# Patient Record
Sex: MALE | Race: WHITE | HISPANIC OR LATINO | Employment: FULL TIME | URBAN - METROPOLITAN AREA
[De-identification: names, ages, dates, MRNs, and addresses within clinical notes are randomized per-mention and may not be internally consistent; named-entity substitution may affect disease eponyms.]

---

## 2022-09-02 ENCOUNTER — OFFICE VISIT (OUTPATIENT)
Dept: URGENT CARE | Facility: PHYSICIAN GROUP | Age: 37
End: 2022-09-02
Payer: COMMERCIAL

## 2022-09-02 VITALS
SYSTOLIC BLOOD PRESSURE: 126 MMHG | RESPIRATION RATE: 22 BRPM | OXYGEN SATURATION: 97 % | HEIGHT: 65 IN | WEIGHT: 182 LBS | TEMPERATURE: 99.2 F | DIASTOLIC BLOOD PRESSURE: 80 MMHG | BODY MASS INDEX: 30.32 KG/M2 | HEART RATE: 109 BPM

## 2022-09-02 DIAGNOSIS — S96.912A MUSCLE STRAIN OF LEFT FOOT, INITIAL ENCOUNTER: ICD-10-CM

## 2022-09-02 PROCEDURE — 99203 OFFICE O/P NEW LOW 30 MIN: CPT | Performed by: PHYSICIAN ASSISTANT

## 2022-09-02 RX ORDER — NAPROXEN 500 MG/1
500 TABLET ORAL 2 TIMES DAILY WITH MEALS
Qty: 30 TABLET | Refills: 0 | Status: SHIPPED | OUTPATIENT
Start: 2022-09-02 | End: 2023-02-23

## 2022-09-02 ASSESSMENT — ENCOUNTER SYMPTOMS
ABDOMINAL PAIN: 0
MYALGIAS: 0
CHILLS: 0
COUGH: 0
NAUSEA: 0
DIARRHEA: 0
SHORTNESS OF BREATH: 0
VOMITING: 0
EYE PAIN: 0
CONSTIPATION: 0
HEADACHES: 0
FEVER: 0
SORE THROAT: 0

## 2022-09-02 NOTE — LETTER
September 2, 2022    To Whom It May Concern:         This is confirmation that Gallo Justice attended his scheduled appointment with Guanaco Beaulieu P.A.-C. on 9/02/22.  I recommend this patient take 2 to 4 days of work to recover from injury.  He is cleared to return to work after he is feeling improved.         If you have any questions please do not hesitate to call me at the phone number listed below.    Sincerely,          Guanaco Beaulieu P.A.-C.  [phone]

## 2022-09-03 NOTE — PROGRESS NOTES
"Subjective:   Gallo Justice is a 37 y.o. male who presents for Ankle Pain (Ankle pain x2days ago today, hurts to walk, radiating to bottom of sole. 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 )      This a pleasant 37-year-old male accompanied by family member acting as  whose had worsening left-sided foot pain over the last 3 days.  No acute injury or trauma.  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  Pain has been worse in the last 2 days so much so it hurts to walk.  Has not had any recurrent issues or comorbidities contributing to his ankle pain    Review of Systems   Constitutional:  Negative for chills and fever.   HENT:  Negative for congestion, ear pain and sore throat.    Eyes:  Negative for pain.   Respiratory:  Negative for cough and shortness of breath.    Cardiovascular:  Negative for chest pain.   Gastrointestinal:  Negative for abdominal pain, constipation, diarrhea, nausea and vomiting.   Genitourinary:  Negative for dysuria.   Musculoskeletal:  Negative for myalgias.   Skin:  Negative for rash.   Neurological:  Negative for headaches.     Medications, Allergies, and current problem list reviewed today in Epic.     Objective:     /80   Pulse (!) 109   Temp 37.3 °C (99.2 °F)   Resp (!) 22   Ht 1.651 m (5' 5\")   Wt 82.6 kg (182 lb)   SpO2 97%     Physical Exam  Vitals reviewed.   Constitutional:       Appearance: Normal appearance.   HENT:      Head: Normocephalic and atraumatic.      Right Ear: External ear normal.      Left Ear: External ear normal.      Nose: Nose normal.      Mouth/Throat:      Mouth: Mucous membranes are moist.   Eyes:      Conjunctiva/sclera: Conjunctivae normal.   Cardiovascular:      Rate and Rhythm: Normal rate.   Pulmonary:      Effort: Pulmonary effort is normal.   Musculoskeletal:      Comments:  focal pain and swelling over proximal " forefoot just distal to the ATFL.  No pain with range of motion of the ankle however pain with range of motion of the foot.  No deformity.  Nontender over bony prominences.  Neurovascular intact.  Ambulatory with an antalgic gait   Skin:     General: Skin is warm and dry.      Capillary Refill: Capillary refill takes less than 2 seconds.   Neurological:      Mental Status: He is alert and oriented to person, place, and time.       Assessment/Plan:     Diagnosis and associated orders:     1. Muscle strain of left foot, initial encounter  naproxen (NAPROSYN) 500 MG Tab    Referral to Sports Medicine         Comments/MDM:     Given the patient's forced dorsiflexion at work I would suspect some element of foot sprain or strain specifically of the dorsiflexors of the foot.  I provided him with a handout including gentle stretches and exercises, recommend ice, anti-inflammatories and elevation and I instructed him to take 2 to 4 days off work.  If he is not improving recommend he follow-up with sports medicine to one of our excellent Thai-speaking providers.  Low suspicion for fracture.  No evidence of Lisfranc injury or base of the fifth metatarsal injury         Differential diagnosis, natural history, supportive care, and indications for immediate follow-up discussed.    Advised the patient to follow-up with the primary care physician for recheck, reevaluation, and consideration of further management.    Please note that this dictation was created using voice recognition software. I have made a reasonable attempt to correct obvious errors, but I expect that there are errors of grammar and possibly content that I did not discover before finalizing the note.    This note was electronically signed by Guanaco Beaulieu PA-C

## 2022-12-12 ENCOUNTER — APPOINTMENT (OUTPATIENT)
Dept: RADIOLOGY | Facility: MEDICAL CENTER | Age: 37
End: 2022-12-12
Attending: EMERGENCY MEDICINE
Payer: COMMERCIAL

## 2022-12-12 ENCOUNTER — HOSPITAL ENCOUNTER (EMERGENCY)
Facility: MEDICAL CENTER | Age: 37
End: 2022-12-13
Attending: EMERGENCY MEDICINE
Payer: COMMERCIAL

## 2022-12-12 DIAGNOSIS — J10.1 INFLUENZA A: ICD-10-CM

## 2022-12-12 DIAGNOSIS — I10 HYPERTENSION, UNSPECIFIED TYPE: ICD-10-CM

## 2022-12-12 LAB
ALBUMIN SERPL BCP-MCNC: 4.6 G/DL (ref 3.2–4.9)
ALBUMIN/GLOB SERPL: 1.4 G/DL
ALP SERPL-CCNC: 73 U/L (ref 30–99)
ALT SERPL-CCNC: 112 U/L (ref 2–50)
ANION GAP SERPL CALC-SCNC: 13 MMOL/L (ref 7–16)
AST SERPL-CCNC: 64 U/L (ref 12–45)
BASOPHILS # BLD AUTO: 0.8 % (ref 0–1.8)
BASOPHILS # BLD: 0.06 K/UL (ref 0–0.12)
BILIRUB SERPL-MCNC: 0.5 MG/DL (ref 0.1–1.5)
BUN SERPL-MCNC: 10 MG/DL (ref 8–22)
CALCIUM SERPL-MCNC: 9.7 MG/DL (ref 8.5–10.5)
CHLORIDE SERPL-SCNC: 100 MMOL/L (ref 96–112)
CO2 SERPL-SCNC: 24 MMOL/L (ref 20–33)
CREAT SERPL-MCNC: 0.85 MG/DL (ref 0.5–1.4)
D DIMER PPP IA.FEU-MCNC: 0.31 UG/ML (FEU) (ref 0–0.5)
EKG IMPRESSION: NORMAL
EOSINOPHIL # BLD AUTO: 0.08 K/UL (ref 0–0.51)
EOSINOPHIL NFR BLD: 1.1 % (ref 0–6.9)
ERYTHROCYTE [DISTWIDTH] IN BLOOD BY AUTOMATED COUNT: 41.6 FL (ref 35.9–50)
ETHANOL BLD-MCNC: <10.1 MG/DL
GFR SERPLBLD CREATININE-BSD FMLA CKD-EPI: 114 ML/MIN/1.73 M 2
GLOBULIN SER CALC-MCNC: 3.3 G/DL (ref 1.9–3.5)
GLUCOSE SERPL-MCNC: 103 MG/DL (ref 65–99)
HCT VFR BLD AUTO: 48.7 % (ref 42–52)
HGB BLD-MCNC: 17.1 G/DL (ref 14–18)
IMM GRANULOCYTES # BLD AUTO: 0.03 K/UL (ref 0–0.11)
IMM GRANULOCYTES NFR BLD AUTO: 0.4 % (ref 0–0.9)
LYMPHOCYTES # BLD AUTO: 0.99 K/UL (ref 1–4.8)
LYMPHOCYTES NFR BLD: 13.5 % (ref 22–41)
MCH RBC QN AUTO: 32.4 PG (ref 27–33)
MCHC RBC AUTO-ENTMCNC: 35.1 G/DL (ref 33.7–35.3)
MCV RBC AUTO: 92.2 FL (ref 81.4–97.8)
MONOCYTES # BLD AUTO: 1.1 K/UL (ref 0–0.85)
MONOCYTES NFR BLD AUTO: 15 % (ref 0–13.4)
NEUTROPHILS # BLD AUTO: 5.08 K/UL (ref 1.82–7.42)
NEUTROPHILS NFR BLD: 69.2 % (ref 44–72)
NRBC # BLD AUTO: 0 K/UL
NRBC BLD-RTO: 0 /100 WBC
PLATELET # BLD AUTO: 189 K/UL (ref 164–446)
PMV BLD AUTO: 11.6 FL (ref 9–12.9)
POTASSIUM SERPL-SCNC: 3.6 MMOL/L (ref 3.6–5.5)
PROT SERPL-MCNC: 7.9 G/DL (ref 6–8.2)
RBC # BLD AUTO: 5.28 M/UL (ref 4.7–6.1)
SODIUM SERPL-SCNC: 137 MMOL/L (ref 135–145)
WBC # BLD AUTO: 7.3 K/UL (ref 4.8–10.8)

## 2022-12-12 PROCEDURE — 99284 EMERGENCY DEPT VISIT MOD MDM: CPT

## 2022-12-12 PROCEDURE — 700111 HCHG RX REV CODE 636 W/ 250 OVERRIDE (IP): Performed by: EMERGENCY MEDICINE

## 2022-12-12 PROCEDURE — 700102 HCHG RX REV CODE 250 W/ 637 OVERRIDE(OP): Performed by: EMERGENCY MEDICINE

## 2022-12-12 PROCEDURE — 83605 ASSAY OF LACTIC ACID: CPT

## 2022-12-12 PROCEDURE — C9803 HOPD COVID-19 SPEC COLLECT: HCPCS | Performed by: EMERGENCY MEDICINE

## 2022-12-12 PROCEDURE — 85025 COMPLETE CBC W/AUTO DIFF WBC: CPT

## 2022-12-12 PROCEDURE — 80307 DRUG TEST PRSMV CHEM ANLYZR: CPT

## 2022-12-12 PROCEDURE — 700105 HCHG RX REV CODE 258: Performed by: EMERGENCY MEDICINE

## 2022-12-12 PROCEDURE — 93005 ELECTROCARDIOGRAM TRACING: CPT

## 2022-12-12 PROCEDURE — 82077 ASSAY SPEC XCP UR&BREATH IA: CPT

## 2022-12-12 PROCEDURE — 0241U HCHG SARS-COV-2 COVID-19 NFCT DS RESP RNA 4 TRGT MIC: CPT

## 2022-12-12 PROCEDURE — 96374 THER/PROPH/DIAG INJ IV PUSH: CPT

## 2022-12-12 PROCEDURE — A9270 NON-COVERED ITEM OR SERVICE: HCPCS | Performed by: EMERGENCY MEDICINE

## 2022-12-12 PROCEDURE — 93005 ELECTROCARDIOGRAM TRACING: CPT | Performed by: EMERGENCY MEDICINE

## 2022-12-12 PROCEDURE — 80053 COMPREHEN METABOLIC PANEL: CPT

## 2022-12-12 PROCEDURE — 71045 X-RAY EXAM CHEST 1 VIEW: CPT

## 2022-12-12 PROCEDURE — 36415 COLL VENOUS BLD VENIPUNCTURE: CPT

## 2022-12-12 PROCEDURE — 85379 FIBRIN DEGRADATION QUANT: CPT

## 2022-12-12 RX ORDER — AMLODIPINE BESYLATE 5 MG/1
5 TABLET ORAL ONCE
Status: COMPLETED | OUTPATIENT
Start: 2022-12-13 | End: 2022-12-12

## 2022-12-12 RX ORDER — DIAZEPAM 5 MG/1
5 TABLET ORAL ONCE
Status: COMPLETED | OUTPATIENT
Start: 2022-12-13 | End: 2022-12-13

## 2022-12-12 RX ORDER — LORAZEPAM 2 MG/ML
1 INJECTION INTRAMUSCULAR ONCE
Status: COMPLETED | OUTPATIENT
Start: 2022-12-12 | End: 2022-12-12

## 2022-12-12 RX ORDER — SODIUM CHLORIDE 9 MG/ML
1000 INJECTION, SOLUTION INTRAVENOUS ONCE
Status: COMPLETED | OUTPATIENT
Start: 2022-12-12 | End: 2022-12-13

## 2022-12-12 RX ADMIN — AMLODIPINE BESYLATE 5 MG: 5 TABLET ORAL at 23:59

## 2022-12-12 RX ADMIN — LORAZEPAM 1 MG: 2 INJECTION INTRAMUSCULAR; INTRAVENOUS at 23:40

## 2022-12-12 RX ADMIN — SODIUM CHLORIDE 1000 ML: 9 INJECTION, SOLUTION INTRAVENOUS at 23:39

## 2022-12-13 VITALS
OXYGEN SATURATION: 93 % | DIASTOLIC BLOOD PRESSURE: 122 MMHG | BODY MASS INDEX: 29.76 KG/M2 | WEIGHT: 185.19 LBS | HEIGHT: 66 IN | TEMPERATURE: 100 F | SYSTOLIC BLOOD PRESSURE: 189 MMHG | RESPIRATION RATE: 18 BRPM | HEART RATE: 117 BPM

## 2022-12-13 LAB
AMPHET UR QL SCN: NEGATIVE
BARBITURATES UR QL SCN: NEGATIVE
BENZODIAZ UR QL SCN: NEGATIVE
BZE UR QL SCN: NEGATIVE
CANNABINOIDS UR QL SCN: NEGATIVE
FLUAV RNA SPEC QL NAA+PROBE: POSITIVE
FLUBV RNA SPEC QL NAA+PROBE: NEGATIVE
LACTATE SERPL-SCNC: 2 MMOL/L (ref 0.5–2)
METHADONE UR QL SCN: NEGATIVE
OPIATES UR QL SCN: NEGATIVE
OXYCODONE UR QL SCN: NEGATIVE
PCP UR QL SCN: NEGATIVE
PROPOXYPH UR QL SCN: NEGATIVE
RSV RNA SPEC QL NAA+PROBE: NEGATIVE
SARS-COV-2 RNA RESP QL NAA+PROBE: NOTDETECTED
SPECIMEN SOURCE: ABNORMAL

## 2022-12-13 PROCEDURE — 700102 HCHG RX REV CODE 250 W/ 637 OVERRIDE(OP): Performed by: EMERGENCY MEDICINE

## 2022-12-13 PROCEDURE — A9270 NON-COVERED ITEM OR SERVICE: HCPCS | Performed by: EMERGENCY MEDICINE

## 2022-12-13 RX ORDER — AMLODIPINE BESYLATE 10 MG/1
10 TABLET ORAL DAILY
Qty: 30 TABLET | Refills: 0 | Status: SHIPPED | OUTPATIENT
Start: 2022-12-13 | End: 2023-02-23

## 2022-12-13 RX ORDER — AMLODIPINE BESYLATE 5 MG/1
5 TABLET ORAL ONCE
Status: COMPLETED | OUTPATIENT
Start: 2022-12-13 | End: 2022-12-13

## 2022-12-13 RX ADMIN — AMLODIPINE BESYLATE 5 MG: 5 TABLET ORAL at 02:01

## 2022-12-13 RX ADMIN — DIAZEPAM 5 MG: 5 TABLET ORAL at 00:23

## 2022-12-13 NOTE — ED NOTES
Assumed report and patient care from Valentin MONIQUE. Patient is resting comfortably in bed with no signs of labored breathing or restlessness. POC discussed, wife to translate. No questions or concerns at this time. Whiteboard updated. Pending amlodipine administration. Safety measures in place, call light within reach.

## 2022-12-13 NOTE — ED PROVIDER NOTES
ED Provider Note      Means of Arrival: Private Vehicle  History obtained from: Patient,  was offered but declined.    CHIEF COMPLAINT  Chief Complaint   Patient presents with    Hypertension    Shortness of Breath       HPI  Gallo Justice is a 37 y.o. male who presents with shortness of breath and high blood pressure.  The patient woke this morning feeling somewhat unwell.  He went to work and felt weak with shortness of breath and racing heart.  He reports that he felt somewhat feverish in the morning, as well as tired and that his body was heavy.  He took some Tylenol earlier in the day.  He reports that he is feeling better now on my evaluation but previously was feeling more short of breath.  He denies any chest pain, abdominal pain, ongoing fevers, headaches, cough, leg swelling.  He denies any recreational drug use.  He reports he usually drinks 3 drinks a day but not as sore every day.  Last drink was yesterday.    REVIEW OF SYSTEMS  CONSTITUTIONAL: See HPI  CARDIOVASCULAR:  See HPI  RESPIRATORY:  See HPI  GASTROINTESTINAL:  No abdominal pain.  See HPI for further details.   All other systems are negative.     PAST MEDICAL HISTORY  History reviewed. No pertinent past medical history.    FAMILY HISTORY  History reviewed. No pertinent family history.    SOCIAL HISTORY   reports that he has never smoked. He does not have any smokeless tobacco history on file. He reports current alcohol use. He reports that he does not use drugs.    SURGICAL HISTORY  History reviewed. No pertinent surgical history.    CURRENT MEDICATIONS  Home Medications       Reviewed by Hollie Vega R.N. (Registered Nurse) on 12/12/22 at 2233  Med List Status: Partial     Medication Last Dose Status   naproxen (NAPROSYN) 500 MG Tab  Active                    ALLERGIES  No Known Allergies    PHYSICAL EXAM  VITAL SIGNS: BP (!) 189/122   Pulse (!) 117   Temp 37.8 °C (100 °F) (Temporal)   Resp 18   Ht  "1.676 m (5' 6\")   Wt 84 kg (185 lb 3 oz)   SpO2 93%   BMI 29.89 kg/m²    Gen: Alert  HENT: ATNC  Eyes: Normal conjunctiva  Neck: trachea midline  Resp: no respiratory distress, CTAB  CV: No JVD, RRR, tachycardic, no m/r/g. Equal radial pulses  Abd: non-distended, non-tender  Ext: No deformities, no edema  Psych: normal mood  Neuro: speech fluent, moves all extremities, GCS 15.  No clonus or hyperreflexia      RADIOLOGY/PROCEDURES  DX-CHEST-PORTABLE (1 VIEW)   Final Result         1.  No acute cardiopulmonary disease.          LABS  Labs Reviewed   CBC WITH DIFFERENTIAL - Abnormal; Notable for the following components:       Result Value    Lymphocytes 13.50 (*)     Monocytes 15.00 (*)     Lymphs (Absolute) 0.99 (*)     Monos (Absolute) 1.10 (*)     All other components within normal limits   COMP METABOLIC PANEL - Abnormal; Notable for the following components:    Glucose 103 (*)     AST(SGOT) 64 (*)     ALT(SGPT) 112 (*)     All other components within normal limits   COV-2, FLU A/B, AND RSV BY PCR ([email or web address]) - Abnormal; Notable for the following components:    Influenza virus A RNA POSITIVE (*)     All other components within normal limits   DIAGNOSTIC ALCOHOL   URINE DRUG SCREEN   D-DIMER   LACTIC ACID   ESTIMATED GFR        EKG  Results for orders placed or performed during the hospital encounter of 22   EKG   Result Value Ref Range    Report       [facility]    Test Date:  2022  Pt Name:    RYLEE WASSERMAN    Department: ER  MRN:        [id number]                      Room:  Gender:     Male                         Technician: [id number]  :        1985                   Requested By:ER TRIAGE PROTOCOL  Order #:    [id number]                    Reading MD: Len Tucker    Measurements  Intervals                                Axis  Rate:       120                          P:          28  MI:         139                          QRS:        -13  QRSD:      "  91                           T:          14  QT:         315  QTc:        445    Interpretive Statements  Sinus tachycardia  Probable left atrial enlargement  Abnormal R-wave progression, late transition  Left ventricular hypertrophy  ST elev, probable normal early repol pattern  No previous ECG available for comparison  Electronically Signed On 12- 23:24:08 PST by Len Tucker          COURSE & MEDICAL DECISION MAKING  Pertinent Labs & Imaging studies reviewed. (See chart for details)    Review of medical records: No prior ED visits within our system    Patient presents with tachycardia, shortness of breath.  He does have sinus tachycardia, no evidence for dysrhythmia, atrial fibrillation.  He does also have elevated blood pressure.  He reports drinking daily but only 3 beers.  He felt better after benzodiazepines, however continued to have tachycardia.    Hydration: Patient received IV fluids for tachycardia. After IV fluids patient is improved.    Patient's labs reassuring, no signs of sepsis, anemia.  Urine drug screen negative for amphetamines.  D-dimer negative for pulmonary embolism.  After IV fluids and medications, patient heart rate is downtrending.  He was found to be influenza positive, which is likely causing majority of his symptoms.  He will be started on amlodipine for his high blood pressure.  He does have a primary care doctor and was advised to follow-up with them.    HEART Score: 2    The patient was given return precautions, anticipatory guidance, and the opportunity to ask questions prior to discharge.     Appropriate PPE were worn at this encounter.     FINAL IMPRESSION  1. Hypertension, unspecified type    2. Influenza A         DISPOSITION:  Patient will be discharged home in stable condition.    FOLLOW UP:  Your regular doctor.  To establish a primary care provider within our system, please call [phone]          [facility], Emergency Dept  [address]  [place]  [place] [address]  [phone]    If symptoms worsen      OUTPATIENT MEDICATIONS:  Discharge Medication List as of 12/13/2022  2:14 AM        START taking these medications    Details   amLODIPine (NORVASC) 10 MG Tab Take 1 Tablet by mouth every day., Disp-30 Tablet, R-0, Normal               This dictation was created using voice recognition software. The accuracy of the dictation is limited to the abilities of the software. I expect there may be some errors of grammar and possibly content. The nursing notes were reviewed and certain aspects of this information were incorporated into this note.

## 2022-12-13 NOTE — ED TRIAGE NOTES
.  Chief Complaint   Patient presents with    Hypertension      Pt ambulate to triage with above complaint. Greek interpretor used for interview. Pt denies HA today, Pt notes he was on blood pressure medication prior to today but quit taking the medication because he felt better. Pt does not have the medication anymore.    Pt c/o SOB, unable to lay down because he feels like he is suffocating.    Pt educated on triage process and returned to [facility].

## 2022-12-13 NOTE — DISCHARGE INSTRUCTIONS
You were seen in the emergency department for an illness. Your physical exam and medical tests show you likely have a viral infection. This should improve over time. Treatment for this is to address your symptoms. This includes aggressive oral fluids, increased rest, and appropriate nutrition. You may use acetaminophen and ibuprofen for the treatment of discomfort and/or fever. Be careful that many cold remedies contain acetaminophen and you should not take more than 3000mg of acetaminophen (Tylenol) a day. You may perform warm salt water gargles every 1-2 hours as needed for sore throat. Saline (salt water) nasal sprays can help with sinus congestion.    You are being started on a blood pressure medication to help for your high blood pressure.  It is also important to follow-up with a primary care provider.  Please contact the number to schedule an appointment if you do not have 1.    Please return to the ED if your symptoms get worse, you develop shortness of breath, chest pain, rash or dehydration.

## 2022-12-13 NOTE — ED NOTES
This RN to bedside for medication administration. Wife, Veronica, translated via phone call. Pt medicated per MAR.

## 2022-12-13 NOTE — ED NOTES
Patient discharged from [facility] ED to home with [employer] that wife purchased. Wife on phone while ERP at bedside and RN present to discuss d/c orders. Discharge teaching completed at bedside and patient signature obtained. All questions and concerns addressed. PIV removed. All pt belongings with pt at time of discharge.

## 2023-02-23 ENCOUNTER — OFFICE VISIT (OUTPATIENT)
Dept: CARDIOLOGY | Facility: MEDICAL CENTER | Age: 38
End: 2023-02-23
Payer: COMMERCIAL

## 2023-02-23 VITALS
HEART RATE: 92 BPM | RESPIRATION RATE: 14 BRPM | SYSTOLIC BLOOD PRESSURE: 180 MMHG | BODY MASS INDEX: 29.09 KG/M2 | DIASTOLIC BLOOD PRESSURE: 104 MMHG | HEIGHT: 66 IN | OXYGEN SATURATION: 97 % | WEIGHT: 181 LBS

## 2023-02-23 DIAGNOSIS — I11.9 HYPERTENSIVE LEFT VENTRICULAR HYPERTROPHY, WITHOUT HEART FAILURE: ICD-10-CM

## 2023-02-23 DIAGNOSIS — R06.02 SHORTNESS OF BREATH: ICD-10-CM

## 2023-02-23 DIAGNOSIS — I10 PRIMARY HYPERTENSION: ICD-10-CM

## 2023-02-23 DIAGNOSIS — R00.0 TACHYCARDIA: ICD-10-CM

## 2023-02-23 PROCEDURE — 93000 ELECTROCARDIOGRAM COMPLETE: CPT | Performed by: INTERNAL MEDICINE

## 2023-02-23 PROCEDURE — 99204 OFFICE O/P NEW MOD 45 MIN: CPT

## 2023-02-23 RX ORDER — AMLODIPINE AND BENAZEPRIL HYDROCHLORIDE 10; 40 MG/1; MG/1
CAPSULE ORAL
COMMUNITY
Start: 2023-01-12 | End: 2023-07-25

## 2023-02-23 RX ORDER — METOPROLOL SUCCINATE 25 MG/1
25 TABLET, EXTENDED RELEASE ORAL
COMMUNITY
Start: 2023-01-12 | End: 2023-02-23

## 2023-02-23 RX ORDER — CARVEDILOL 12.5 MG/1
12.5 TABLET ORAL 2 TIMES DAILY WITH MEALS
Qty: 200 TABLET | Refills: 3 | Status: SHIPPED | OUTPATIENT
Start: 2023-02-23 | End: 2023-06-21

## 2023-02-23 RX ORDER — TERAZOSIN 2 MG/1
2 CAPSULE ORAL
COMMUNITY
Start: 2023-02-02

## 2023-02-23 ASSESSMENT — ENCOUNTER SYMPTOMS
GASTROINTESTINAL NEGATIVE: 1
DEPRESSION: 0
SHORTNESS OF BREATH: 1
PND: 0
DIZZINESS: 1
EYES NEGATIVE: 1
ORTHOPNEA: 0
PALPITATIONS: 0
CONSTITUTIONAL NEGATIVE: 1
MUSCULOSKELETAL NEGATIVE: 1
NERVOUS/ANXIOUS: 0

## 2023-02-23 ASSESSMENT — FIBROSIS 4 INDEX: FIB4 SCORE: 1.18

## 2023-02-23 NOTE — PROGRESS NOTES
Chief Complaint   Patient presents with    Shortness of Breath    Tachycardia    Hypertension       Subjective     Gallo Aaron Justice is a 37 y.o. male who presents today for follow up of his HTN. No significant medical history.  He was seen back in December 12, 2022 in the ER.  He presented with shortness of breath, hypertension and tachycardia and was found to have influenza A infection and was told to follow-up with his PCP.  At that time he also admitted to drinking 3 beers a day.    They are accompanied today by his sister who assists in translation    They are a new patient to our practice.    They have been feeling overall well. He does mention an episode of shortness of breath and dizziness at work recently.    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      History reviewed. No pertinent past medical history.  History reviewed. No pertinent surgical history.  History reviewed. No pertinent family history.  Social History     Socioeconomic History    Marital status: Single     Spouse name: Not on file    Number of children: Not on file    Years of education: Not on file    Highest education level: Not on file   Occupational History    Not on file   Tobacco Use    Smoking status: Never    Smokeless tobacco: Not on file   Substance and Sexual Activity    Alcohol use: Yes    Drug use: Never    Sexual activity: Not on file   Other Topics Concern    Not on file   Social History Narrative    Not on file     Social Determinants of Health     Financial Resource Strain: Not on file   Food Insecurity: Not on file   Transportation Needs: Not on file   Physical Activity: Not on file   Stress: Not on file   Social Connections: Not on file   Intimate Partner Violence: Not on file   Housing Stability: Not on file     No Known  "Allergies  Outpatient Encounter Medications as of 2/23/2023   Medication Sig Dispense Refill    amlodipine-benazepril (LOTREL) 10-40 MG per capsule TAKE 1 CAPSULE BY MOUTH EVERY DAY, REPLACES INDIVIDUAL TABLETS      terazosin (HYTRIN) 2 MG Cap Take 2 mg by mouth at bedtime.      carvedilol (COREG) 12.5 MG Tab Take 1 Tablet by mouth 2 times a day with meals. 200 Tablet 3    [DISCONTINUED] metoprolol SR (TOPROL XL) 25 MG TABLET SR 24 HR Take 25 mg by mouth at bedtime.      [DISCONTINUED] amLODIPine (NORVASC) 10 MG Tab Take 1 Tablet by mouth every day. 30 Tablet 0    [DISCONTINUED] naproxen (NAPROSYN) 500 MG Tab Take 1 Tablet by mouth 2 times a day with meals. (Patient not taking: Reported on 2/23/2023) 30 Tablet 0     No facility-administered encounter medications on file as of 2/23/2023.     Review of Systems   Constitutional: Negative.    HENT: Negative.     Eyes: Negative.    Respiratory:  Positive for shortness of breath.    Cardiovascular:  Negative for chest pain, palpitations, orthopnea, leg swelling and PND.   Gastrointestinal: Negative.    Genitourinary: Negative.    Musculoskeletal: Negative.    Skin: Negative.    Neurological:  Positive for dizziness.   Endo/Heme/Allergies: Negative.    Psychiatric/Behavioral:  Negative for depression. The patient is not nervous/anxious.             Objective     BP (!) 180/104 (BP Location: Left arm, Patient Position: Sitting, BP Cuff Size: Adult)   Pulse 92   Resp 14   Ht 1.676 m (5' 6\")   Wt 82.1 kg (181 lb)   SpO2 97%   BMI 29.21 kg/m²     Physical Exam  Constitutional:       Appearance: Normal appearance.   HENT:      Head: Normocephalic.   Neck:      Vascular: No JVD.   Cardiovascular:      Rate and Rhythm: Normal rate and regular rhythm.      Pulses: Normal pulses.      Heart sounds: Normal heart sounds. No murmur heard.    No friction rub.   Pulmonary:      Effort: Pulmonary effort is normal.      Breath sounds: Normal breath sounds.   Abdominal:      " Palpations: Abdomen is soft.   Musculoskeletal:         General: Normal range of motion.      Right lower leg: No edema.      Left lower leg: No edema.   Skin:     General: Skin is warm and dry.   Neurological:      General: No focal deficit present.      Mental Status: He is alert and oriented to person, place, and time.   Psychiatric:         Mood and Affect: Mood normal.         Behavior: Behavior normal.          No results found for: CHOLSTRLTOT, LDL, HDL, TRIGLYCERIDE    Lab Results   Component Value Date/Time    SODIUM 137 12/12/2022 11:11 PM    POTASSIUM 3.6 12/12/2022 11:11 PM    CHLORIDE 100 12/12/2022 11:11 PM    CO2 24 12/12/2022 11:11 PM    GLUCOSE 103 (H) 12/12/2022 11:11 PM    BUN 10 12/12/2022 11:11 PM    CREATININE 0.85 12/12/2022 11:11 PM     Lab Results   Component Value Date/Time    ALKPHOSPHAT 73 12/12/2022 11:11 PM    ASTSGOT 64 (H) 12/12/2022 11:11 PM    ALTSGPT 112 (H) 12/12/2022 11:11 PM    TBILIRUBIN 0.5 12/12/2022 11:11 PM      EKG 2/23/2023  SR with LVH rate 72, 0.143/0.097/0.402    Assessment & Plan     1. Tachycardia  EKG      2. Shortness of breath  EC-ECHOCARDIOGRAM COMPLETE W/O CONT      3. Primary hypertension        4. Hypertensive left ventricular hypertrophy, without heart failure            Medical Decision Making: Today's Assessment/Status/Plan:        Hypertension  - poor control  - better at home  - continue Lotrel 10-40 mg, terazosin 2 mg at night, change metoprolol to Coreg 12.5 mg twice daily  - consider up titration of Coreg at next visit if BP not at goal  - goal < 130/80  - check BP daily 2 hours after taking medication and keep log of measurements   - 2 week non-provider appointment for BP check    LVH  - on EKG from today's visit  - with reports of shortness of breath and occasional dizziness  -likely due to uncontrolled HTN  - echocardiogram ordered    Follow up with Belen HINTON in 3 months    This note was dictated using Dragon speech recognition  software.

## 2023-02-24 LAB — EKG IMPRESSION: NORMAL

## 2023-03-09 ENCOUNTER — TELEPHONE (OUTPATIENT)
Dept: CARDIOLOGY | Facility: MEDICAL CENTER | Age: 38
End: 2023-03-09

## 2023-03-09 ENCOUNTER — PATIENT MESSAGE (OUTPATIENT)
Dept: CARDIOLOGY | Facility: MEDICAL CENTER | Age: 38
End: 2023-03-09

## 2023-03-09 ENCOUNTER — NON-PROVIDER VISIT (OUTPATIENT)
Dept: CARDIOLOGY | Facility: MEDICAL CENTER | Age: 38
End: 2023-03-09
Payer: COMMERCIAL

## 2023-03-09 VITALS — SYSTOLIC BLOOD PRESSURE: 142 MMHG | DIASTOLIC BLOOD PRESSURE: 92 MMHG

## 2023-03-09 PROCEDURE — 99999 PR NO CHARGE: CPT

## 2023-03-09 NOTE — PROGRESS NOTES
"Patient was here today for blood pressure check per . BP readings located in vital sign section including patient's home BP cuff readings. Informed patient we will forward readings to nurse and they will receive a call with recommendations.    Patient reports tingling \"feels like worms\" on the back of his neck constantly but is exacerbated when he is working. Occasionally he feels short of breath but he is not experiencing it today.     He has been tracking his BP and brought the logs today. He has been taking it both before and after his medication. He is taking his medication every day as prescribed.     BP: 114/53  HR: 72  BP: 111/75  HR: 70  BP: 139/99  HR: 77  BP: 142/92  HR: 70  BP: 133/90  HR: 80  BP: 141/93  HR: 80  BP: 122/76  HR: 84  "

## 2023-03-09 NOTE — TELEPHONE ENCOUNTER
"To LH: Patient in for non-provider today for BP check. BP in office 142/92. BP log provided to LL and in non-provider visit encounter as well. No symptoms today but does have a \"tingling\" feeling that is worse while at work. Please advise any changes if needed, thank you!  "

## 2023-03-10 NOTE — PATIENT COMMUNICATION
OG Cotton.  You 22 minutes ago (4:13 PM)     Lets increase his carvedilol to 25 mg BID, and check in with him in 2 weeks, thank you!        [employer] message sent to patient, awaiting patient response and will follow up as needed.

## 2023-04-21 ENCOUNTER — TELEPHONE (OUTPATIENT)
Dept: HEALTH INFORMATION MANAGEMENT | Facility: OTHER | Age: 38
End: 2023-04-21
Payer: COMMERCIAL

## 2023-06-16 ENCOUNTER — HOSPITAL ENCOUNTER (OUTPATIENT)
Dept: CARDIOLOGY | Facility: MEDICAL CENTER | Age: 38
End: 2023-06-16
Payer: COMMERCIAL

## 2023-06-16 DIAGNOSIS — R06.02 SHORTNESS OF BREATH: ICD-10-CM

## 2023-06-16 LAB
LV EJECT FRACT  99904: 60
LV EJECT FRACT MOD 2C 99903: 63.13
LV EJECT FRACT MOD 4C 99902: 77.7
LV EJECT FRACT MOD BP 99901: 71.87

## 2023-06-16 PROCEDURE — 93306 TTE W/DOPPLER COMPLETE: CPT | Mod: 26 | Performed by: INTERNAL MEDICINE

## 2023-06-16 PROCEDURE — 93306 TTE W/DOPPLER COMPLETE: CPT

## 2023-06-21 ENCOUNTER — OFFICE VISIT (OUTPATIENT)
Dept: CARDIOLOGY | Facility: MEDICAL CENTER | Age: 38
End: 2023-06-21
Payer: COMMERCIAL

## 2023-06-21 VITALS
HEIGHT: 66 IN | OXYGEN SATURATION: 96 % | RESPIRATION RATE: 16 BRPM | HEART RATE: 88 BPM | DIASTOLIC BLOOD PRESSURE: 102 MMHG | BODY MASS INDEX: 29.25 KG/M2 | SYSTOLIC BLOOD PRESSURE: 164 MMHG | WEIGHT: 182 LBS

## 2023-06-21 DIAGNOSIS — I77.810 AORTIC ECTASIA, THORACIC (HCC): ICD-10-CM

## 2023-06-21 DIAGNOSIS — I10 PRIMARY HYPERTENSION: ICD-10-CM

## 2023-06-21 DIAGNOSIS — I11.9 HYPERTENSIVE LEFT VENTRICULAR HYPERTROPHY, WITHOUT HEART FAILURE: ICD-10-CM

## 2023-06-21 PROCEDURE — 99211 OFF/OP EST MAY X REQ PHY/QHP: CPT

## 2023-06-21 PROCEDURE — 3080F DIAST BP >= 90 MM HG: CPT

## 2023-06-21 PROCEDURE — 99214 OFFICE O/P EST MOD 30 MIN: CPT

## 2023-06-21 PROCEDURE — 3077F SYST BP >= 140 MM HG: CPT

## 2023-06-21 RX ORDER — HYDROCHLOROTHIAZIDE 25 MG/1
25 TABLET ORAL DAILY
Qty: 100 TABLET | Refills: 3 | Status: SHIPPED | OUTPATIENT
Start: 2023-06-21 | End: 2023-08-02 | Stop reason: SDUPTHER

## 2023-06-21 ASSESSMENT — ENCOUNTER SYMPTOMS
ORTHOPNEA: 0
PALPITATIONS: 0
EYES NEGATIVE: 1
NERVOUS/ANXIOUS: 0
CONSTITUTIONAL NEGATIVE: 1
PND: 0
SHORTNESS OF BREATH: 0
DEPRESSION: 0
GASTROINTESTINAL NEGATIVE: 1
MUSCULOSKELETAL NEGATIVE: 1
NEUROLOGICAL NEGATIVE: 1

## 2023-06-21 ASSESSMENT — FIBROSIS 4 INDEX: FIB4 SCORE: 1.22

## 2023-06-21 NOTE — PROGRESS NOTES
"Chief Complaint   Patient presents with    Tachycardia    Shortness of Breath    Hypertension     F/V DX: Primary hypertension       Subjective     Bolivar Aaron Justice is a 38 y.o. male who presents today for follow up of his HTN. No significant medical history.  He was seen in December 12, 2022 in the ER.  He presented with shortness of breath, hypertension and tachycardia and was found to have influenza A infection and was told to follow-up with his PCP.  At that time he also admitted to drinking 3 beers a day.     They are accompanied today by his sister who assists in translation      Last seen by myself on 2/23/2023, at that time he had been feeling overall well. He does mention an episode of shortness of breath and dizziness at work recently.    He reports that he has been taking his blood pressure medications intermittently, he believes that his carvedilol has been causing him to have hot flashes.  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    History reviewed. No pertinent past medical history.  History reviewed. No pertinent surgical history.  History reviewed. No pertinent family history.  Social History     Socioeconomic History    Marital status:      Spouse name: Not on file    Number of children: Not on file    Years of education: Not on file    Highest education level: Not on file   Occupational History    Not on file   Tobacco Use    Smoking status: Never    Smokeless tobacco: Never   Substance and Sexual Activity    Alcohol use: Yes    Drug use: Never    Sexual activity: Not on file   Other Topics Concern    Not on file   Social History Narrative    Not on file     Social Determinants of Health     Financial Resource Strain: Not on " "file   Food Insecurity: Not on file   Transportation Needs: Not on file   Physical Activity: Not on file   Stress: Not on file   Social Connections: Not on file   Intimate Partner Violence: Not on file   Housing Stability: Not on file     No Known Allergies  Outpatient Encounter Medications as of 6/21/2023   Medication Sig Dispense Refill    hydroCHLOROthiazide (HYDRODIURIL) 25 MG Tab Take 1 Tablet by mouth every day. 100 Tablet 3    amlodipine-benazepril (LOTREL) 10-40 MG per capsule TAKE 1 CAPSULE BY MOUTH EVERY DAY, REPLACES INDIVIDUAL TABLETS      terazosin (HYTRIN) 2 MG Cap Take 2 mg by mouth at bedtime.      [DISCONTINUED] carvedilol (COREG) 12.5 MG Tab Take 1 Tablet by mouth 2 times a day with meals. 200 Tablet 3     No facility-administered encounter medications on file as of 6/21/2023.     Review of Systems   Constitutional: Negative.         Hot flashes   HENT: Negative.     Eyes: Negative.    Respiratory:  Negative for shortness of breath.    Cardiovascular:  Negative for chest pain, palpitations, orthopnea, leg swelling and PND.   Gastrointestinal: Negative.    Genitourinary: Negative.    Musculoskeletal: Negative.    Skin: Negative.    Neurological: Negative.    Endo/Heme/Allergies: Negative.    Psychiatric/Behavioral:  Negative for depression. The patient is not nervous/anxious.               Objective     BP (!) 164/102 (BP Location: Left arm, Patient Position: Sitting, BP Cuff Size: Adult)   Pulse 88   Resp 16   Ht 1.676 m (5' 6\")   Wt 82.6 kg (182 lb)   SpO2 96%   BMI 29.38 kg/m²     Physical Exam  Constitutional:       Appearance: Normal appearance.   HENT:      Head: Normocephalic.   Neck:      Vascular: No JVD.   Cardiovascular:      Rate and Rhythm: Normal rate and regular rhythm.      Pulses: Normal pulses.      Heart sounds: Normal heart sounds. No murmur heard.     No friction rub.   Pulmonary:      Effort: Pulmonary effort is normal.      Breath sounds: Normal breath sounds. "   Abdominal:      Palpations: Abdomen is soft.   Musculoskeletal:         General: Normal range of motion.      Right lower leg: No edema.      Left lower leg: No edema.   Skin:     General: Skin is warm and dry.   Neurological:      General: No focal deficit present.      Mental Status: He is alert and oriented to person, place, and time.   Psychiatric:         Mood and Affect: Mood normal.         Behavior: Behavior normal.            No results found for: CHOLSTRLTOT, LDL, HDL, TRIGLYCERIDE    Lab Results   Component Value Date/Time    SODIUM 137 12/12/2022 11:11 PM    POTASSIUM 3.6 12/12/2022 11:11 PM    CHLORIDE 100 12/12/2022 11:11 PM    CO2 24 12/12/2022 11:11 PM    GLUCOSE 103 (H) 12/12/2022 11:11 PM    BUN 10 12/12/2022 11:11 PM    CREATININE 0.85 12/12/2022 11:11 PM     Lab Results   Component Value Date/Time    ALKPHOSPHAT 73 12/12/2022 11:11 PM    ASTSGOT 64 (H) 12/12/2022 11:11 PM    ALTSGPT 112 (H) 12/12/2022 11:11 PM    TBILIRUBIN 0.5 12/12/2022 11:11 PM      EKG 2/23/2023  SR with LVH rate 72, 0.143/0.097/0.402    Echocardiogram 6/16/2023  CONCLUSIONS  No prior study is available for comparison.   The left ventricular ejection fraction is visually estimated to be 60%.  Normal regional wall motion.  The ascending aorta is dilated with a diameter of 4 cm.    Assessment & Plan     1. Primary hypertension  hydroCHLOROthiazide (HYDRODIURIL) 25 MG Tab      2. Hypertensive left ventricular hypertrophy, without heart failure        3. Aortic ectasia, thoracic (HCC)            Medical Decision Making: Today's Assessment/Status/Plan:        Hypertension  - poor control, we discussed in detail the importance of adherence to medication regimen and that he will not feel when his blood pressure is high.  -He did not tolerate the carvedilol, we will stop this and switch him to hydrochlorothiazide 25 mg daily  - continue Lotrel 10-40 mg, terazosin 2 mg at night   - consider referral to vascular medicine if blood  "pressure remains uncontrolled at next visit  - goal < 130/80  - check BP daily 2 hours after taking medication and keep log of measurements   - 2 week non-provider appointment for BP check     LVH  - noted on EKG  - with reports of shortness of breath and occasional dizziness  -likely due to uncontrolled HTN, we discussed at length the need to keep his blood pressure under control in order to prevent worsening of this condition  - echocardiogram showed \"Normal left ventricular chamber size. Moderate concentric left   ventricular hypertrophy.\"    Aortic ectasia  -Noted on echocardiogram 4 cm  -Check CTA in 1 year for surveillance     Follow up with Belen HINTON in 1 month     This note was dictated using Dragon speech recognition software.                "

## 2023-07-05 ENCOUNTER — NON-PROVIDER VISIT (OUTPATIENT)
Dept: CARDIOLOGY | Facility: MEDICAL CENTER | Age: 38
End: 2023-07-05
Payer: COMMERCIAL

## 2023-07-05 VITALS — OXYGEN SATURATION: 96 % | SYSTOLIC BLOOD PRESSURE: 118 MMHG | DIASTOLIC BLOOD PRESSURE: 76 MMHG | HEART RATE: 99 BPM

## 2023-07-05 NOTE — PROGRESS NOTES
LH - Please advise on BP readings in chart. Any recommendations?   [Sclera] : the sclera and conjunctiva were normal [Neck Appearance] : the appearance of the neck was normal [Respiration, Rhythm And Depth] : normal respiratory rhythm and effort [Auscultation Breath Sounds / Voice Sounds] : lungs were clear to auscultation bilaterally [Heart Rate And Rhythm] : heart rate was normal and rhythm regular [Examination Of The Chest] : the chest was normal in appearance [Supraclavicular Lymph Nodes Enlarged Bilaterally] : supraclavicular [Axillary Lymph Nodes Enlarged Bilaterally] : axillary [Abnormal Walk] : normal gait [Motor Tone] : muscle strength and tone were normal [Skin Color & Pigmentation] : normal skin color and pigmentation [Sensation] : the sensory exam was normal to light touch and pinprick [Motor Exam] : the motor exam was normal [Oriented To Time, Place, And Person] : oriented to person, place, and time [Impaired Insight] : insight and judgment were intact [FreeTextEntry1] : Left axillary and supra clavicular lymph nodes palpated and painful to touch

## 2023-07-05 NOTE — PROGRESS NOTES
Patient was here today for blood pressure check per . Informed patient we will forward readings to nurse and they will receive a call with recommendations.    Patient stated he is feeling fine and he is taking all of his medication.

## 2023-07-25 ENCOUNTER — OFFICE VISIT (OUTPATIENT)
Dept: CARDIOLOGY | Facility: MEDICAL CENTER | Age: 38
End: 2023-07-25
Payer: COMMERCIAL

## 2023-07-25 VITALS
BODY MASS INDEX: 29.73 KG/M2 | RESPIRATION RATE: 14 BRPM | HEART RATE: 75 BPM | WEIGHT: 185 LBS | OXYGEN SATURATION: 98 % | DIASTOLIC BLOOD PRESSURE: 82 MMHG | HEIGHT: 66 IN | SYSTOLIC BLOOD PRESSURE: 130 MMHG

## 2023-07-25 DIAGNOSIS — I10 PRIMARY HYPERTENSION: ICD-10-CM

## 2023-07-25 DIAGNOSIS — I77.810 AORTIC ECTASIA, THORACIC (HCC): ICD-10-CM

## 2023-07-25 DIAGNOSIS — I11.9 HYPERTENSIVE LEFT VENTRICULAR HYPERTROPHY, WITHOUT HEART FAILURE: ICD-10-CM

## 2023-07-25 DIAGNOSIS — R05.3 CHRONIC COUGH: ICD-10-CM

## 2023-07-25 PROCEDURE — 3079F DIAST BP 80-89 MM HG: CPT

## 2023-07-25 PROCEDURE — 3075F SYST BP GE 130 - 139MM HG: CPT

## 2023-07-25 PROCEDURE — 99212 OFFICE O/P EST SF 10 MIN: CPT

## 2023-07-25 PROCEDURE — 99211 OFF/OP EST MAY X REQ PHY/QHP: CPT

## 2023-07-25 PROCEDURE — 99214 OFFICE O/P EST MOD 30 MIN: CPT

## 2023-07-25 RX ORDER — AMLODIPINE BESYLATE 10 MG/1
10 TABLET ORAL DAILY
Qty: 100 TABLET | Refills: 3 | Status: SHIPPED | OUTPATIENT
Start: 2023-07-25

## 2023-07-25 RX ORDER — METOPROLOL SUCCINATE 25 MG/1
25 TABLET, EXTENDED RELEASE ORAL
COMMUNITY
Start: 2023-07-08 | End: 2023-08-01 | Stop reason: SDUPTHER

## 2023-07-25 ASSESSMENT — ENCOUNTER SYMPTOMS
MUSCULOSKELETAL NEGATIVE: 1
CONSTITUTIONAL NEGATIVE: 1
NERVOUS/ANXIOUS: 0
ORTHOPNEA: 0
PALPITATIONS: 0
GASTROINTESTINAL NEGATIVE: 1
COUGH: 1
EYES NEGATIVE: 1
NEUROLOGICAL NEGATIVE: 1
PND: 0
DEPRESSION: 0
SHORTNESS OF BREATH: 0

## 2023-07-25 ASSESSMENT — FIBROSIS 4 INDEX: FIB4 SCORE: 1.22

## 2023-07-25 NOTE — PROGRESS NOTES
"Chief Complaint   Patient presents with    Hypertension    Tachycardia       Subjective     Bolivarchava Justice is a 38 y.o. male who presents today for follow up of his HTN.        They are accompanied today by his sister who assists in translation      Seen by myself on 2/23/2023, at that time he had been feeling overall well. He does mention an episode of shortness of breath and dizziness at work recently.     Seen by myself on 6/21/2023. He reports that he has been taking his blood pressure medications intermittently, he believes that his carvedilol has been causing him to have hot flashes.  He was only taking his blood pressure medication when he \"felt bad\". His medications were adjusted.    Today 7/25/2023: he is feeling overall well, but he reports having an ongoing cough which has not improved even after his recovery from influenza A.     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       History reviewed. No pertinent past medical history.  History reviewed. No pertinent surgical history.  History reviewed. No pertinent family history.  Social History     Socioeconomic History    Marital status:      Spouse name: Not on file    Number of children: Not on file    Years of education: Not on file    Highest education level: Not on file   Occupational History    Not on file   Tobacco Use    Smoking status: Never    Smokeless tobacco: Never   Substance and Sexual Activity    Alcohol use: Yes    Drug use: Never    Sexual activity: Not on file   Other Topics Concern    Not on file   Social History Narrative    Not on file     Social Determinants of Health     Financial Resource Strain: Not on file   Food Insecurity: Not on file   Transportation Needs: Not on file   Physical Activity: Not on file   Stress: Not on file   Social " "Connections: Not on file   Intimate Partner Violence: Not on file   Housing Stability: Not on file     No Known Allergies  Outpatient Encounter Medications as of 7/25/2023   Medication Sig Dispense Refill    metoprolol SR (TOPROL XL) 25 MG TABLET SR 24 HR Take 25 mg by mouth at bedtime.      amLODIPine (NORVASC) 10 MG Tab Take 1 Tablet by mouth every day. 100 Tablet 3    hydroCHLOROthiazide (HYDRODIURIL) 25 MG Tab Take 1 Tablet by mouth every day. 100 Tablet 3    terazosin (HYTRIN) 2 MG Cap Take 2 mg by mouth at bedtime.      [DISCONTINUED] amlodipine-benazepril (LOTREL) 10-40 MG per capsule TAKE 1 CAPSULE BY MOUTH EVERY DAY, REPLACES INDIVIDUAL TABLETS       No facility-administered encounter medications on file as of 7/25/2023.     Review of Systems   Constitutional: Negative.    HENT: Negative.     Eyes: Negative.    Respiratory:  Positive for cough. Negative for shortness of breath.    Cardiovascular:  Negative for chest pain, palpitations, orthopnea, leg swelling and PND.   Gastrointestinal: Negative.    Genitourinary: Negative.    Musculoskeletal: Negative.    Skin: Negative.    Neurological: Negative.    Endo/Heme/Allergies: Negative.    Psychiatric/Behavioral:  Negative for depression. The patient is not nervous/anxious.               Objective     /82 (BP Location: Left arm, Patient Position: Sitting, BP Cuff Size: Adult)   Pulse 75   Resp 14   Ht 1.676 m (5' 6\")   Wt 83.9 kg (185 lb)   SpO2 98%   BMI 29.86 kg/m²     Physical Exam  Constitutional:       Appearance: Normal appearance.   HENT:      Head: Normocephalic.   Neck:      Vascular: No JVD.   Cardiovascular:      Rate and Rhythm: Normal rate and regular rhythm.      Pulses: Normal pulses.      Heart sounds: Normal heart sounds. No murmur heard.     No friction rub.   Pulmonary:      Effort: Pulmonary effort is normal.      Breath sounds: Normal breath sounds.   Abdominal:      Palpations: Abdomen is soft.   Musculoskeletal:         " General: Normal range of motion.      Right lower leg: No edema.      Left lower leg: No edema.   Skin:     General: Skin is warm and dry.   Neurological:      General: No focal deficit present.      Mental Status: He is alert and oriented to person, place, and time.   Psychiatric:         Mood and Affect: Mood normal.         Behavior: Behavior normal.            No results found for: CHOLSTRLTOT, LDL, HDL, TRIGLYCERIDE    Lab Results   Component Value Date/Time    SODIUM 137 12/12/2022 11:11 PM    POTASSIUM 3.6 12/12/2022 11:11 PM    CHLORIDE 100 12/12/2022 11:11 PM    CO2 24 12/12/2022 11:11 PM    GLUCOSE 103 (H) 12/12/2022 11:11 PM    BUN 10 12/12/2022 11:11 PM    CREATININE 0.85 12/12/2022 11:11 PM     Lab Results   Component Value Date/Time    ALKPHOSPHAT 73 12/12/2022 11:11 PM    ASTSGOT 64 (H) 12/12/2022 11:11 PM    ALTSGPT 112 (H) 12/12/2022 11:11 PM    TBILIRUBIN 0.5 12/12/2022 11:11 PM      EKG 2/23/2023  SR with LVH rate 72, 0.143/0.097/0.402     Echocardiogram 6/16/2023  CONCLUSIONS  No prior study is available for comparison.   The left ventricular ejection fraction is visually estimated to be 60%.  Normal regional wall motion.  The ascending aorta is dilated with a diameter of 4 cm.    Assessment & Plan     1. Primary hypertension  amLODIPine (NORVASC) 10 MG Tab      2. Hypertensive left ventricular hypertrophy, without heart failure        3. Aortic ectasia, thoracic (HCC)  CT-CHEST (THORAX) WITH      4. Chronic cough            Medical Decision Making: Today's Assessment/Status/Plan:        Chronic cough  -possibly due to ACE inhibitor, we will drop the benazapril for now and continue with amlodipine alone    Hypertension  -His blood pressures looking really good, however possible concern with cough due to ACE inhibitor  - goal < 130/80  - check BP daily 2 hours after taking medication and keep log of measurements   -check in in 2 weeks, expect that his blood pressures may be higher with dropping  "the benazepril     LVH  - noted on EKG  -likely due to uncontrolled HTN, we discussed at length the need to keep his blood pressure under control in order to prevent worsening of this condition  - echocardiogram showed \"Normal left ventricular chamber size. Moderate concentric left ventricular hypertrophy.\"     Aortic ectasia  -Noted on echocardiogram 4 cm  -Check CTA in 1 year for surveillance     Follow up with Belen HINTON in 6 months     This note was dictated using Dragon speech recognition software.                "

## 2023-08-01 ENCOUNTER — PATIENT MESSAGE (OUTPATIENT)
Dept: CARDIOLOGY | Facility: MEDICAL CENTER | Age: 38
End: 2023-08-01
Payer: COMMERCIAL

## 2023-08-01 DIAGNOSIS — I10 PRIMARY HYPERTENSION: ICD-10-CM

## 2023-08-01 RX ORDER — METOPROLOL SUCCINATE 50 MG/1
50 TABLET, EXTENDED RELEASE ORAL
Qty: 90 TABLET | Refills: 3 | Status: SHIPPED | OUTPATIENT
Start: 2023-08-01 | End: 2023-08-02

## 2023-08-02 DIAGNOSIS — I10 PRIMARY HYPERTENSION: ICD-10-CM

## 2023-08-02 RX ORDER — HYDROCHLOROTHIAZIDE 50 MG/1
50 TABLET ORAL DAILY
Qty: 100 TABLET | Refills: 3 | Status: SHIPPED | OUTPATIENT
Start: 2023-08-02

## 2023-11-13 ENCOUNTER — HOSPITAL ENCOUNTER (OUTPATIENT)
Facility: MEDICAL CENTER | Age: 38
End: 2023-11-14
Attending: EMERGENCY MEDICINE | Admitting: SURGERY
Payer: COMMERCIAL

## 2023-11-13 ENCOUNTER — APPOINTMENT (OUTPATIENT)
Dept: RADIOLOGY | Facility: MEDICAL CENTER | Age: 38
End: 2023-11-13
Attending: EMERGENCY MEDICINE
Payer: COMMERCIAL

## 2023-11-13 ENCOUNTER — OFFICE VISIT (OUTPATIENT)
Dept: URGENT CARE | Facility: CLINIC | Age: 38
End: 2023-11-13
Payer: COMMERCIAL

## 2023-11-13 ENCOUNTER — ANESTHESIA EVENT (OUTPATIENT)
Dept: SURGERY | Facility: MEDICAL CENTER | Age: 38
End: 2023-11-13
Payer: COMMERCIAL

## 2023-11-13 ENCOUNTER — ANESTHESIA (OUTPATIENT)
Dept: SURGERY | Facility: MEDICAL CENTER | Age: 38
End: 2023-11-13
Payer: COMMERCIAL

## 2023-11-13 VITALS
HEART RATE: 80 BPM | BODY MASS INDEX: 29.99 KG/M2 | WEIGHT: 180 LBS | RESPIRATION RATE: 16 BRPM | OXYGEN SATURATION: 98 % | HEIGHT: 65 IN | TEMPERATURE: 97.2 F | SYSTOLIC BLOOD PRESSURE: 140 MMHG | DIASTOLIC BLOOD PRESSURE: 92 MMHG

## 2023-11-13 DIAGNOSIS — R10.9 ABDOMINAL PAIN, UNSPECIFIED ABDOMINAL LOCATION: ICD-10-CM

## 2023-11-13 DIAGNOSIS — K59.00 CONSTIPATION, UNSPECIFIED CONSTIPATION TYPE: ICD-10-CM

## 2023-11-13 DIAGNOSIS — K35.80 ACUTE APPENDICITIS, UNSPECIFIED ACUTE APPENDICITIS TYPE: ICD-10-CM

## 2023-11-13 DIAGNOSIS — K35.890 OTHER ACUTE APPENDICITIS: ICD-10-CM

## 2023-11-13 LAB
ALBUMIN SERPL BCP-MCNC: 5.3 G/DL (ref 3.2–4.9)
ALBUMIN/GLOB SERPL: 1.6 G/DL
ALP SERPL-CCNC: 70 U/L (ref 30–99)
ALT SERPL-CCNC: 48 U/L (ref 2–50)
ANION GAP SERPL CALC-SCNC: 15 MMOL/L (ref 7–16)
APPEARANCE UR: CLEAR
AST SERPL-CCNC: 37 U/L (ref 12–45)
BACTERIA #/AREA URNS HPF: NEGATIVE /HPF
BASOPHILS # BLD AUTO: 0.4 % (ref 0–1.8)
BASOPHILS # BLD: 0.07 K/UL (ref 0–0.12)
BILIRUB SERPL-MCNC: 0.9 MG/DL (ref 0.1–1.5)
BILIRUB UR QL STRIP.AUTO: NEGATIVE
BUN SERPL-MCNC: 10 MG/DL (ref 8–22)
CALCIUM ALBUM COR SERPL-MCNC: 9 MG/DL (ref 8.5–10.5)
CALCIUM SERPL-MCNC: 10 MG/DL (ref 8.5–10.5)
CHLORIDE SERPL-SCNC: 96 MMOL/L (ref 96–112)
CO2 SERPL-SCNC: 24 MMOL/L (ref 20–33)
COLOR UR: YELLOW
CREAT SERPL-MCNC: 0.8 MG/DL (ref 0.5–1.4)
EOSINOPHIL # BLD AUTO: 0.03 K/UL (ref 0–0.51)
EOSINOPHIL NFR BLD: 0.2 % (ref 0–6.9)
EPI CELLS #/AREA URNS HPF: NEGATIVE /HPF
ERYTHROCYTE [DISTWIDTH] IN BLOOD BY AUTOMATED COUNT: 42 FL (ref 35.9–50)
GFR SERPLBLD CREATININE-BSD FMLA CKD-EPI: 116 ML/MIN/1.73 M 2
GLOBULIN SER CALC-MCNC: 3.3 G/DL (ref 1.9–3.5)
GLUCOSE SERPL-MCNC: 111 MG/DL (ref 65–99)
GLUCOSE UR STRIP.AUTO-MCNC: NEGATIVE MG/DL
HCT VFR BLD AUTO: 50.3 % (ref 42–52)
HGB BLD-MCNC: 17.5 G/DL (ref 14–18)
HYALINE CASTS #/AREA URNS LPF: ABNORMAL /LPF
IMM GRANULOCYTES # BLD AUTO: 0.08 K/UL (ref 0–0.11)
IMM GRANULOCYTES NFR BLD AUTO: 0.5 % (ref 0–0.9)
KETONES UR STRIP.AUTO-MCNC: ABNORMAL MG/DL
LEUKOCYTE ESTERASE UR QL STRIP.AUTO: NEGATIVE
LIPASE SERPL-CCNC: 14 U/L (ref 11–82)
LYMPHOCYTES # BLD AUTO: 1.9 K/UL (ref 1–4.8)
LYMPHOCYTES NFR BLD: 12.2 % (ref 22–41)
MCH RBC QN AUTO: 32.1 PG (ref 27–33)
MCHC RBC AUTO-ENTMCNC: 34.8 G/DL (ref 32.3–36.5)
MCV RBC AUTO: 92.1 FL (ref 81.4–97.8)
MICRO URNS: ABNORMAL
MONOCYTES # BLD AUTO: 0.87 K/UL (ref 0–0.85)
MONOCYTES NFR BLD AUTO: 5.6 % (ref 0–13.4)
NEUTROPHILS # BLD AUTO: 12.67 K/UL (ref 1.82–7.42)
NEUTROPHILS NFR BLD: 81.1 % (ref 44–72)
NITRITE UR QL STRIP.AUTO: NEGATIVE
NRBC # BLD AUTO: 0 K/UL
NRBC BLD-RTO: 0 /100 WBC (ref 0–0.2)
PH UR STRIP.AUTO: 6 [PH] (ref 5–8)
PLATELET # BLD AUTO: 213 K/UL (ref 164–446)
PMV BLD AUTO: 11.8 FL (ref 9–12.9)
POTASSIUM SERPL-SCNC: 3.4 MMOL/L (ref 3.6–5.5)
PROT SERPL-MCNC: 8.6 G/DL (ref 6–8.2)
PROT UR QL STRIP: 100 MG/DL
RBC # BLD AUTO: 5.46 M/UL (ref 4.7–6.1)
RBC # URNS HPF: ABNORMAL /HPF
RBC UR QL AUTO: ABNORMAL
SODIUM SERPL-SCNC: 135 MMOL/L (ref 135–145)
SP GR UR STRIP.AUTO: 1.02
UROBILINOGEN UR STRIP.AUTO-MCNC: 0.2 MG/DL
WBC # BLD AUTO: 15.6 K/UL (ref 4.8–10.8)
WBC #/AREA URNS HPF: ABNORMAL /HPF

## 2023-11-13 PROCEDURE — 700105 HCHG RX REV CODE 258: Performed by: ANESTHESIOLOGY

## 2023-11-13 PROCEDURE — 96374 THER/PROPH/DIAG INJ IV PUSH: CPT

## 2023-11-13 PROCEDURE — 80053 COMPREHEN METABOLIC PANEL: CPT

## 2023-11-13 PROCEDURE — 99222 1ST HOSP IP/OBS MODERATE 55: CPT | Mod: 57 | Performed by: SURGERY

## 2023-11-13 PROCEDURE — 74177 CT ABD & PELVIS W/CONTRAST: CPT

## 2023-11-13 PROCEDURE — 3077F SYST BP >= 140 MM HG: CPT | Performed by: NURSE PRACTITIONER

## 2023-11-13 PROCEDURE — 160009 HCHG ANES TIME/MIN: Performed by: SURGERY

## 2023-11-13 PROCEDURE — 700111 HCHG RX REV CODE 636 W/ 250 OVERRIDE (IP): Mod: JZ | Performed by: ANESTHESIOLOGY

## 2023-11-13 PROCEDURE — A9270 NON-COVERED ITEM OR SERVICE: HCPCS | Performed by: ANESTHESIOLOGY

## 2023-11-13 PROCEDURE — 700111 HCHG RX REV CODE 636 W/ 250 OVERRIDE (IP): Mod: JZ | Performed by: EMERGENCY MEDICINE

## 2023-11-13 PROCEDURE — 81001 URINALYSIS AUTO W/SCOPE: CPT

## 2023-11-13 PROCEDURE — 700102 HCHG RX REV CODE 250 W/ 637 OVERRIDE(OP): Performed by: ANESTHESIOLOGY

## 2023-11-13 PROCEDURE — 700101 HCHG RX REV CODE 250: Performed by: SURGERY

## 2023-11-13 PROCEDURE — 160025 RECOVERY II MINUTES (STATS): Performed by: SURGERY

## 2023-11-13 PROCEDURE — 160035 HCHG PACU - 1ST 60 MINS PHASE I: Performed by: SURGERY

## 2023-11-13 PROCEDURE — 44970 LAPAROSCOPY APPENDECTOMY: CPT | Performed by: SURGERY

## 2023-11-13 PROCEDURE — 36415 COLL VENOUS BLD VENIPUNCTURE: CPT

## 2023-11-13 PROCEDURE — 3080F DIAST BP >= 90 MM HG: CPT | Performed by: NURSE PRACTITIONER

## 2023-11-13 PROCEDURE — 99291 CRITICAL CARE FIRST HOUR: CPT

## 2023-11-13 PROCEDURE — 160029 HCHG SURGERY MINUTES - 1ST 30 MINS LEVEL 4: Performed by: SURGERY

## 2023-11-13 PROCEDURE — 160046 HCHG PACU - 1ST 60 MINS PHASE II: Performed by: SURGERY

## 2023-11-13 PROCEDURE — 99215 OFFICE O/P EST HI 40 MIN: CPT | Performed by: NURSE PRACTITIONER

## 2023-11-13 PROCEDURE — 88304 TISSUE EXAM BY PATHOLOGIST: CPT

## 2023-11-13 PROCEDURE — 160036 HCHG PACU - EA ADDL 30 MINS PHASE I: Performed by: SURGERY

## 2023-11-13 PROCEDURE — 700117 HCHG RX CONTRAST REV CODE 255: Performed by: EMERGENCY MEDICINE

## 2023-11-13 PROCEDURE — 83690 ASSAY OF LIPASE: CPT

## 2023-11-13 PROCEDURE — 85025 COMPLETE CBC W/AUTO DIFF WBC: CPT

## 2023-11-13 PROCEDURE — 160002 HCHG RECOVERY MINUTES (STAT): Performed by: SURGERY

## 2023-11-13 PROCEDURE — 700101 HCHG RX REV CODE 250: Performed by: ANESTHESIOLOGY

## 2023-11-13 PROCEDURE — 160048 HCHG OR STATISTICAL LEVEL 1-5: Performed by: SURGERY

## 2023-11-13 PROCEDURE — 160041 HCHG SURGERY MINUTES - EA ADDL 1 MIN LEVEL 4: Performed by: SURGERY

## 2023-11-13 RX ORDER — CEFAZOLIN SODIUM 1 G/3ML
INJECTION, POWDER, FOR SOLUTION INTRAMUSCULAR; INTRAVENOUS PRN
Status: DISCONTINUED | OUTPATIENT
Start: 2023-11-13 | End: 2023-11-13 | Stop reason: SURG

## 2023-11-13 RX ORDER — ENEMA 19; 7 G/133ML; G/133ML
1 ENEMA RECTAL
Status: DISCONTINUED | OUTPATIENT
Start: 2023-11-13 | End: 2023-11-14 | Stop reason: HOSPADM

## 2023-11-13 RX ORDER — HYDROMORPHONE HYDROCHLORIDE 1 MG/ML
0.2 INJECTION, SOLUTION INTRAMUSCULAR; INTRAVENOUS; SUBCUTANEOUS
Status: DISCONTINUED | OUTPATIENT
Start: 2023-11-13 | End: 2023-11-14 | Stop reason: HOSPADM

## 2023-11-13 RX ORDER — ONDANSETRON 2 MG/ML
4 INJECTION INTRAMUSCULAR; INTRAVENOUS
Status: DISCONTINUED | OUTPATIENT
Start: 2023-11-13 | End: 2023-11-14 | Stop reason: HOSPADM

## 2023-11-13 RX ORDER — LABETALOL HYDROCHLORIDE 5 MG/ML
10 INJECTION, SOLUTION INTRAVENOUS EVERY 4 HOURS PRN
Status: DISCONTINUED | OUTPATIENT
Start: 2023-11-13 | End: 2023-11-14 | Stop reason: HOSPADM

## 2023-11-13 RX ORDER — MORPHINE SULFATE 4 MG/ML
4 INJECTION INTRAVENOUS ONCE
Status: DISCONTINUED | OUTPATIENT
Start: 2023-11-13 | End: 2023-11-14 | Stop reason: HOSPADM

## 2023-11-13 RX ORDER — ONDANSETRON 2 MG/ML
INJECTION INTRAMUSCULAR; INTRAVENOUS PRN
Status: DISCONTINUED | OUTPATIENT
Start: 2023-11-13 | End: 2023-11-13 | Stop reason: SURG

## 2023-11-13 RX ORDER — OXYCODONE HYDROCHLORIDE 5 MG/1
5 TABLET ORAL
Status: DISCONTINUED | OUTPATIENT
Start: 2023-11-13 | End: 2023-11-14 | Stop reason: HOSPADM

## 2023-11-13 RX ORDER — BISACODYL 10 MG
10 SUPPOSITORY, RECTAL RECTAL
Status: DISCONTINUED | OUTPATIENT
Start: 2023-11-13 | End: 2023-11-14 | Stop reason: HOSPADM

## 2023-11-13 RX ORDER — OXYCODONE HCL 5 MG/5 ML
10 SOLUTION, ORAL ORAL
Status: COMPLETED | OUTPATIENT
Start: 2023-11-13 | End: 2023-11-13

## 2023-11-13 RX ORDER — OXYCODONE HYDROCHLORIDE 5 MG/1
10 TABLET ORAL
Status: DISCONTINUED | OUTPATIENT
Start: 2023-11-13 | End: 2023-11-14 | Stop reason: HOSPADM

## 2023-11-13 RX ORDER — ONDANSETRON 2 MG/ML
4 INJECTION INTRAMUSCULAR; INTRAVENOUS ONCE
Status: DISCONTINUED | OUTPATIENT
Start: 2023-11-13 | End: 2023-11-14 | Stop reason: HOSPADM

## 2023-11-13 RX ORDER — CELECOXIB 200 MG/1
200 CAPSULE ORAL 2 TIMES DAILY PRN
Status: DISCONTINUED | OUTPATIENT
Start: 2023-11-18 | End: 2023-11-14 | Stop reason: HOSPADM

## 2023-11-13 RX ORDER — ONDANSETRON 4 MG/1
4 TABLET, ORALLY DISINTEGRATING ORAL EVERY 4 HOURS PRN
Status: DISCONTINUED | OUTPATIENT
Start: 2023-11-13 | End: 2023-11-14 | Stop reason: HOSPADM

## 2023-11-13 RX ORDER — OXYCODONE HCL 5 MG/5 ML
5 SOLUTION, ORAL ORAL
Status: COMPLETED | OUTPATIENT
Start: 2023-11-13 | End: 2023-11-13

## 2023-11-13 RX ORDER — HYDROMORPHONE HYDROCHLORIDE 1 MG/ML
0.4 INJECTION, SOLUTION INTRAMUSCULAR; INTRAVENOUS; SUBCUTANEOUS
Status: DISCONTINUED | OUTPATIENT
Start: 2023-11-13 | End: 2023-11-14 | Stop reason: HOSPADM

## 2023-11-13 RX ORDER — POLYETHYLENE GLYCOL 3350 17 G/17G
1 POWDER, FOR SOLUTION ORAL 2 TIMES DAILY
Status: DISCONTINUED | OUTPATIENT
Start: 2023-11-13 | End: 2023-11-14 | Stop reason: HOSPADM

## 2023-11-13 RX ORDER — HYDROMORPHONE HYDROCHLORIDE 1 MG/ML
0.1 INJECTION, SOLUTION INTRAMUSCULAR; INTRAVENOUS; SUBCUTANEOUS
Status: DISCONTINUED | OUTPATIENT
Start: 2023-11-13 | End: 2023-11-14 | Stop reason: HOSPADM

## 2023-11-13 RX ORDER — CELECOXIB 200 MG/1
200 CAPSULE ORAL 2 TIMES DAILY
Status: DISCONTINUED | OUTPATIENT
Start: 2023-11-13 | End: 2023-11-14 | Stop reason: HOSPADM

## 2023-11-13 RX ORDER — SODIUM CHLORIDE, SODIUM LACTATE, POTASSIUM CHLORIDE, CALCIUM CHLORIDE 600; 310; 30; 20 MG/100ML; MG/100ML; MG/100ML; MG/100ML
INJECTION, SOLUTION INTRAVENOUS
Status: DISCONTINUED | OUTPATIENT
Start: 2023-11-13 | End: 2023-11-13 | Stop reason: SURG

## 2023-11-13 RX ORDER — MEPERIDINE HYDROCHLORIDE 25 MG/ML
12.5 INJECTION INTRAMUSCULAR; INTRAVENOUS; SUBCUTANEOUS
Status: DISCONTINUED | OUTPATIENT
Start: 2023-11-13 | End: 2023-11-14 | Stop reason: HOSPADM

## 2023-11-13 RX ORDER — IPRATROPIUM BROMIDE AND ALBUTEROL SULFATE 2.5; .5 MG/3ML; MG/3ML
3 SOLUTION RESPIRATORY (INHALATION)
Status: DISCONTINUED | OUTPATIENT
Start: 2023-11-13 | End: 2023-11-14 | Stop reason: HOSPADM

## 2023-11-13 RX ORDER — HYDROMORPHONE HYDROCHLORIDE 1 MG/ML
0.5 INJECTION, SOLUTION INTRAMUSCULAR; INTRAVENOUS; SUBCUTANEOUS
Status: DISCONTINUED | OUTPATIENT
Start: 2023-11-13 | End: 2023-11-14 | Stop reason: HOSPADM

## 2023-11-13 RX ORDER — AMOXICILLIN 250 MG
1 CAPSULE ORAL NIGHTLY
Status: DISCONTINUED | OUTPATIENT
Start: 2023-11-13 | End: 2023-11-14 | Stop reason: HOSPADM

## 2023-11-13 RX ORDER — HALOPERIDOL 5 MG/ML
1 INJECTION INTRAMUSCULAR
Status: DISCONTINUED | OUTPATIENT
Start: 2023-11-13 | End: 2023-11-14 | Stop reason: HOSPADM

## 2023-11-13 RX ORDER — ONDANSETRON 2 MG/ML
4 INJECTION INTRAMUSCULAR; INTRAVENOUS EVERY 4 HOURS PRN
Status: DISCONTINUED | OUTPATIENT
Start: 2023-11-13 | End: 2023-11-14 | Stop reason: HOSPADM

## 2023-11-13 RX ORDER — BUPIVACAINE HYDROCHLORIDE AND EPINEPHRINE 5; 5 MG/ML; UG/ML
INJECTION, SOLUTION EPIDURAL; INTRACAUDAL; PERINEURAL
Status: DISCONTINUED | OUTPATIENT
Start: 2023-11-13 | End: 2023-11-13 | Stop reason: HOSPADM

## 2023-11-13 RX ORDER — SODIUM CHLORIDE, SODIUM LACTATE, POTASSIUM CHLORIDE, CALCIUM CHLORIDE 600; 310; 30; 20 MG/100ML; MG/100ML; MG/100ML; MG/100ML
INJECTION, SOLUTION INTRAVENOUS CONTINUOUS
Status: DISCONTINUED | OUTPATIENT
Start: 2023-11-13 | End: 2023-11-14 | Stop reason: HOSPADM

## 2023-11-13 RX ORDER — AMOXICILLIN 250 MG
1 CAPSULE ORAL
Status: DISCONTINUED | OUTPATIENT
Start: 2023-11-13 | End: 2023-11-14 | Stop reason: HOSPADM

## 2023-11-13 RX ORDER — CEFTRIAXONE 2 G/1
2000 INJECTION, POWDER, FOR SOLUTION INTRAMUSCULAR; INTRAVENOUS ONCE
Status: COMPLETED | OUTPATIENT
Start: 2023-11-13 | End: 2023-11-13

## 2023-11-13 RX ORDER — DEXAMETHASONE SODIUM PHOSPHATE 4 MG/ML
INJECTION, SOLUTION INTRA-ARTICULAR; INTRALESIONAL; INTRAMUSCULAR; INTRAVENOUS; SOFT TISSUE PRN
Status: DISCONTINUED | OUTPATIENT
Start: 2023-11-13 | End: 2023-11-13 | Stop reason: SURG

## 2023-11-13 RX ORDER — KETOROLAC TROMETHAMINE 30 MG/ML
INJECTION, SOLUTION INTRAMUSCULAR; INTRAVENOUS PRN
Status: DISCONTINUED | OUTPATIENT
Start: 2023-11-13 | End: 2023-11-13 | Stop reason: SURG

## 2023-11-13 RX ORDER — LABETALOL HYDROCHLORIDE 5 MG/ML
5 INJECTION, SOLUTION INTRAVENOUS
Status: DISCONTINUED | OUTPATIENT
Start: 2023-11-13 | End: 2023-11-14 | Stop reason: HOSPADM

## 2023-11-13 RX ORDER — ACETAMINOPHEN 325 MG/1
650 TABLET ORAL EVERY 6 HOURS
Status: DISCONTINUED | OUTPATIENT
Start: 2023-11-14 | End: 2023-11-14 | Stop reason: HOSPADM

## 2023-11-13 RX ORDER — DOCUSATE SODIUM 100 MG/1
100 CAPSULE, LIQUID FILLED ORAL 2 TIMES DAILY
Status: DISCONTINUED | OUTPATIENT
Start: 2023-11-13 | End: 2023-11-14 | Stop reason: HOSPADM

## 2023-11-13 RX ORDER — ACETAMINOPHEN 325 MG/1
650 TABLET ORAL EVERY 6 HOURS PRN
Status: DISCONTINUED | OUTPATIENT
Start: 2023-11-19 | End: 2023-11-14 | Stop reason: HOSPADM

## 2023-11-13 RX ORDER — ROCURONIUM BROMIDE 10 MG/ML
INJECTION, SOLUTION INTRAVENOUS PRN
Status: DISCONTINUED | OUTPATIENT
Start: 2023-11-13 | End: 2023-11-13 | Stop reason: SURG

## 2023-11-13 RX ORDER — MIDAZOLAM HYDROCHLORIDE 1 MG/ML
1 INJECTION INTRAMUSCULAR; INTRAVENOUS
Status: DISCONTINUED | OUTPATIENT
Start: 2023-11-13 | End: 2023-11-14 | Stop reason: HOSPADM

## 2023-11-13 RX ORDER — LIDOCAINE HYDROCHLORIDE 20 MG/ML
INJECTION, SOLUTION EPIDURAL; INFILTRATION; INTRACAUDAL; PERINEURAL PRN
Status: DISCONTINUED | OUTPATIENT
Start: 2023-11-13 | End: 2023-11-13 | Stop reason: SURG

## 2023-11-13 RX ORDER — HYDRALAZINE HYDROCHLORIDE 20 MG/ML
5 INJECTION INTRAMUSCULAR; INTRAVENOUS
Status: DISCONTINUED | OUTPATIENT
Start: 2023-11-13 | End: 2023-11-14 | Stop reason: HOSPADM

## 2023-11-13 RX ADMIN — PROPOFOL 200 MG: 10 INJECTION, EMULSION INTRAVENOUS at 20:52

## 2023-11-13 RX ADMIN — CEFTRIAXONE SODIUM 2000 MG: 2 INJECTION, POWDER, FOR SOLUTION INTRAMUSCULAR; INTRAVENOUS at 18:43

## 2023-11-13 RX ADMIN — LIDOCAINE HYDROCHLORIDE 100 MG: 20 INJECTION, SOLUTION EPIDURAL; INFILTRATION; INTRACAUDAL at 20:52

## 2023-11-13 RX ADMIN — SUGAMMADEX 200 MG: 100 INJECTION, SOLUTION INTRAVENOUS at 21:25

## 2023-11-13 RX ADMIN — DEXAMETHASONE SODIUM PHOSPHATE 8 MG: 4 INJECTION INTRA-ARTICULAR; INTRALESIONAL; INTRAMUSCULAR; INTRAVENOUS; SOFT TISSUE at 20:52

## 2023-11-13 RX ADMIN — OXYCODONE HYDROCHLORIDE 10 MG: 5 SOLUTION ORAL at 23:18

## 2023-11-13 RX ADMIN — FENTANYL CITRATE 100 MCG: 50 INJECTION, SOLUTION INTRAMUSCULAR; INTRAVENOUS at 20:52

## 2023-11-13 RX ADMIN — CEFAZOLIN 2 G: 1 INJECTION, POWDER, FOR SOLUTION INTRAMUSCULAR; INTRAVENOUS at 20:57

## 2023-11-13 RX ADMIN — ROCURONIUM BROMIDE 50 MG: 50 INJECTION, SOLUTION INTRAVENOUS at 20:52

## 2023-11-13 RX ADMIN — SODIUM CHLORIDE, POTASSIUM CHLORIDE, SODIUM LACTATE AND CALCIUM CHLORIDE: 600; 310; 30; 20 INJECTION, SOLUTION INTRAVENOUS at 20:52

## 2023-11-13 RX ADMIN — KETOROLAC TROMETHAMINE 30 MG: 30 INJECTION, SOLUTION INTRAMUSCULAR; INTRAVENOUS at 21:25

## 2023-11-13 RX ADMIN — IOHEXOL 100 ML: 350 INJECTION, SOLUTION INTRAVENOUS at 17:46

## 2023-11-13 RX ADMIN — ONDANSETRON 4 MG: 2 INJECTION INTRAMUSCULAR; INTRAVENOUS at 20:52

## 2023-11-13 ASSESSMENT — ENCOUNTER SYMPTOMS
HEADACHES: 0
DIARRHEA: 0
BACK PAIN: 0
NAUSEA: 1
CONSTIPATION: 1
COUGH: 0
VOMITING: 0
FEVER: 0
ABDOMINAL PAIN: 1

## 2023-11-13 ASSESSMENT — PAIN SCALES - GENERAL: PAIN_LEVEL: 0

## 2023-11-13 ASSESSMENT — PAIN DESCRIPTION - PAIN TYPE
TYPE: ACUTE PAIN;SURGICAL PAIN

## 2023-11-13 ASSESSMENT — FIBROSIS 4 INDEX
FIB4 SCORE: 1.22
FIB4 SCORE: 1.22

## 2023-11-13 ASSESSMENT — CROHNS DISEASE ACTIVITY INDEX (CDAI): CDAI SCORE: 0

## 2023-11-13 NOTE — PROGRESS NOTES
Subjective:   Gallo Justice is a 38 y.o. male who presents for Abdominal Pain and Constipation      Abdominal Pain  This is a new problem. Episode onset: 2 weeks pain started had improved howver a day ago abdomen started hurting with sever pain and constipation. The problem has been rapidly worsening. The pain is located in the generalized abdominal region. The pain is at a severity of 10/10. The pain is severe. The abdominal pain does not radiate. Associated symptoms include constipation and nausea. Pertinent negatives include no diarrhea, dysuria, fever, headaches or vomiting. The pain is aggravated by palpation and movement. The pain is relieved by Nothing. He has tried nothing for the symptoms. The treatment provided no relief. There is no history of Crohn's disease.       Review of Systems   Constitutional:  Negative for fever and malaise/fatigue.   Respiratory:  Negative for cough.    Cardiovascular:  Negative for chest pain.   Gastrointestinal:  Positive for abdominal pain, constipation and nausea. Negative for diarrhea and vomiting.   Genitourinary:  Negative for dysuria.   Musculoskeletal:  Negative for back pain.   Neurological:  Negative for headaches.       Medications:    amLODIPine Tabs  hydroCHLOROthiazide Tabs  terazosin Caps    Allergies: Patient has no known allergies.    Problem List: Gallo Justice does not have any pertinent problems on file.    Surgical History:  No past surgical history on file.    Past Social Hx: Gallo Justice  reports that he has never smoked. He has never used smokeless tobacco. He reports current alcohol use. He reports that he does not use drugs.     Past Family Hx:  Gallo Justice family history is not on file.     Problem list, medications, and allergies reviewed by myself today in Epic.     Objective:     BP (!) 140/92 (BP Location: Left arm, Patient Position: Sitting, BP Cuff Size: Adult)   Pulse 80   Temp 36.2 °C (97.2  "°F) (Temporal)   Resp 16   Ht 1.651 m (5' 5\")   Wt 81.6 kg (180 lb)   SpO2 98%   BMI 29.95 kg/m²     Physical Exam  Vitals and nursing note reviewed.   Constitutional:       General: He is not in acute distress.     Appearance: He is well-developed.   HENT:      Head: Normocephalic and atraumatic.      Right Ear: External ear normal.      Left Ear: External ear normal.      Nose: Nose normal.      Mouth/Throat:      Mouth: Mucous membranes are moist.   Eyes:      Conjunctiva/sclera: Conjunctivae normal.   Cardiovascular:      Rate and Rhythm: Normal rate.   Pulmonary:      Effort: Pulmonary effort is normal. No respiratory distress.      Breath sounds: Normal breath sounds.   Abdominal:      General: There is no distension.      Tenderness: There is abdominal tenderness in the right lower quadrant and periumbilical area. There is rebound. There is no right CVA tenderness, left CVA tenderness or guarding. Negative signs include Elmore's sign, Rovsing's sign, McBurney's sign, psoas sign and obturator sign.      Comments: Patient grimacing in pain   Musculoskeletal:         General: Normal range of motion.   Skin:     General: Skin is warm and dry.   Neurological:      General: No focal deficit present.      Mental Status: He is alert and oriented to person, place, and time. Mental status is at baseline.      Gait: Gait (gait at baseline) normal.   Psychiatric:         Judgment: Judgment normal.         Assessment/Plan:     Diagnosis and associated orders:     1. Abdominal pain, unspecified abdominal location        2. Constipation, unspecified constipation type             Comments/MDM:     Patient is a 3-year-old male present with stated above, pain out of proportion At this time, I feel the patient requires a higher level of care including closer monitoring, stat lab work and/or imaging for further evaluation for complaints of abdomen pain. This has been discussed with the patient and they state agreement and " understanding.  I offered the patient an ambulance ride and  the patient is declining at this time. The patient is in no acute distress upon clinic departure and will go directly to ED without delay.            Please note that this dictation was created using voice recognition software. I have made a reasonable attempt to correct obvious errors, but I expect that there are errors of grammar and possibly content that I did not discover before finalizing the note.    This note was electronically signed by Jesu HINTON.

## 2023-11-13 NOTE — ED TRIAGE NOTES
Gallo Walker Justice  38 y.o. male  Chief Complaint   Patient presents with    Abdominal Pain     Beginning yesterday, worsening over today. Last BM this am, small and hard. Pt states he feels nauseous and wants to vomit but cannot    Sent from Urgent Care       Vitals:    11/13/23 1515   BP: (!) 168/110   Pulse: 97   Resp: 16   Temp: 36.3 °C (97.3 °F)   SpO2: 98%       Patient educated on triage process and encouraged to alert staff of any changes in condition.    Pt ambulatory to triage. Pt is primarily [place] speaking, wife present during triage for translation.

## 2023-11-14 ENCOUNTER — PHARMACY VISIT (OUTPATIENT)
Dept: PHARMACY | Facility: MEDICAL CENTER | Age: 38
End: 2023-11-14
Payer: COMMERCIAL

## 2023-11-14 VITALS
RESPIRATION RATE: 16 BRPM | DIASTOLIC BLOOD PRESSURE: 82 MMHG | HEART RATE: 90 BPM | BODY MASS INDEX: 30.19 KG/M2 | TEMPERATURE: 98.5 F | OXYGEN SATURATION: 90 % | WEIGHT: 181.22 LBS | HEIGHT: 65 IN | SYSTOLIC BLOOD PRESSURE: 140 MMHG

## 2023-11-14 LAB — PATHOLOGY CONSULT NOTE: NORMAL

## 2023-11-14 PROCEDURE — 700102 HCHG RX REV CODE 250 W/ 637 OVERRIDE(OP): Performed by: SURGERY

## 2023-11-14 PROCEDURE — 700105 HCHG RX REV CODE 258: Performed by: SURGERY

## 2023-11-14 PROCEDURE — A9270 NON-COVERED ITEM OR SERVICE: HCPCS | Performed by: SURGERY

## 2023-11-14 PROCEDURE — RXMED WILLOW AMBULATORY MEDICATION CHARGE: Performed by: NURSE PRACTITIONER

## 2023-11-14 RX ORDER — CELECOXIB 200 MG/1
200 CAPSULE ORAL 2 TIMES DAILY
Qty: 60 CAPSULE | Refills: 0 | Status: SHIPPED | OUTPATIENT
Start: 2023-11-14

## 2023-11-14 RX ORDER — ACETAMINOPHEN 325 MG/1
650 TABLET ORAL EVERY 6 HOURS
Qty: 100 TABLET | Refills: 0 | Status: SHIPPED | OUTPATIENT
Start: 2023-11-14 | End: 2023-11-28

## 2023-11-14 RX ADMIN — SODIUM CHLORIDE, POTASSIUM CHLORIDE, SODIUM LACTATE AND CALCIUM CHLORIDE: 600; 310; 30; 20 INJECTION, SOLUTION INTRAVENOUS at 00:37

## 2023-11-14 RX ADMIN — CELECOXIB 200 MG: 200 CAPSULE ORAL at 08:15

## 2023-11-14 RX ADMIN — ACETAMINOPHEN 650 MG: 325 TABLET, FILM COATED ORAL at 00:36

## 2023-11-14 RX ADMIN — DOCUSATE SODIUM 100 MG: 100 CAPSULE, LIQUID FILLED ORAL at 05:00

## 2023-11-14 RX ADMIN — ACETAMINOPHEN 650 MG: 325 TABLET, FILM COATED ORAL at 04:59

## 2023-11-14 ASSESSMENT — PAIN DESCRIPTION - PAIN TYPE
TYPE: ACUTE PAIN;SURGICAL PAIN
TYPE: SURGICAL PAIN
TYPE: SURGICAL PAIN

## 2023-11-14 NOTE — ED PROVIDER NOTES
ER Provider Note    Scribed for Inna Milner M.d. by Cheli Crowley. 11/13/2023  4:31 PM    Primary Care Provider: Alejandro Amin     CHIEF COMPLAINT  Chief Complaint   Patient presents with    Abdominal Pain     Beginning yesterday, worsening over today. Last BM this am, small and hard. Pt states he feels nauseous and wants to vomit but cannot    Sent from Urgent Care     LIMITATION TO HISTORY   Select: Language Armenian,  Used     HPI/ROS  OUTSIDE HISTORIAN(S):  Significant other Wife clarifies history     EXTERNAL RECORDS REVIEWED  Outpatient Notes outpatient note reviewed from cardiology.  He was seen for primary hypertension.  Who is also seen in urgent care today for abdominal pain.  Pain did not radiate he has constipation and nausea.    Gallo Justice is a 38 y.o. male who presents to the ED for generalized abdominal pain onset 2 weeks ago. The patient's wife states that the abdominal pain initially occurred for 3 days straight about 2 weeks ago and resolved on its own. She notes that the pain returned last night and has increased since then prompting them to present to the ED. Currently in the ED, the patient notes that the pain has improved and denies wanting pain medication at this time. The patient has associated nausea and constipation. He adds that he feels like he needs to vomit but has not been able to. The patient denies fever. He denies any history of an appendectomy. The patient does not have any known allergies to medications.    PAST MEDICAL HISTORY  No past medical history noted.    SURGICAL HISTORY  No past surgical history noted.    FAMILY HISTORY  No family history pertinent.    SOCIAL HISTORY   reports that he has never smoked. He has never used smokeless tobacco. He reports current alcohol use. He reports that he does not use drugs.    CURRENT MEDICATIONS  Current Discharge Medication List        CONTINUE these medications which have NOT CHANGED    Details  "  hydroCHLOROthiazide (HYDRODIURIL) 50 MG Tab Take 1 Tablet by mouth every day.  Qty: 100 Tablet, Refills: 3    Associated Diagnoses: Primary hypertension      amLODIPine (NORVASC) 10 MG Tab Take 1 Tablet by mouth every day.  Qty: 100 Tablet, Refills: 3    Associated Diagnoses: Primary hypertension      terazosin (HYTRIN) 2 MG Cap Take 2 mg by mouth at bedtime.             ALLERGIES  Patient has no known allergies.    PHYSICAL EXAM  BP (!) 168/110   Pulse 97   Temp 36.3 °C (97.3 °F) (Temporal)   Resp 16   Ht 1.651 m (5' 5\")   Wt 82.2 kg (181 lb 3.5 oz)   SpO2 98%   BMI 30.16 kg/m²   Constitutional: Alert in no apparent distress.  HENT: No signs of trauma, Bilateral external ears normal, Nose normal.   Eyes: Pupils are equal and reactive, Conjunctiva normal, Non-icteric.   Neck: Normal range of motion, No tenderness, Supple, No stridor.   Cardiovascular: Regular rate and rhythm, no murmurs.   Thorax & Lungs: Normal breath sounds, No respiratory distress, No wheezing, No chest tenderness.   Abdomen: Bowel sounds normal, Soft, No tenderness, No masses, No peritoneal signs.  Skin: Warm, Dry, No erythema, No rash.   Musculoskeletal:  No major deformities noted.  Neurologic: Alert, moving all extremities without difficulty, no focal deficits.     DIAGNOSTIC STUDIES & PROCEDURES    Labs:   Results for orders placed or performed during the hospital encounter of 11/13/23   CBC WITH DIFFERENTIAL   Result Value Ref Range    WBC 15.6 (H) 4.8 - 10.8 K/uL    RBC 5.46 4.70 - 6.10 M/uL    Hemoglobin 17.5 14.0 - 18.0 g/dL    Hematocrit 50.3 42.0 - 52.0 %    MCV 92.1 81.4 - 97.8 fL    MCH 32.1 27.0 - 33.0 pg    MCHC 34.8 32.3 - 36.5 g/dL    RDW 42.0 35.9 - 50.0 fL    Platelet Count 213 164 - 446 K/uL    MPV 11.8 9.0 - 12.9 fL    Neutrophils-Polys 81.10 (H) 44.00 - 72.00 %    Lymphocytes 12.20 (L) 22.00 - 41.00 %    Monocytes 5.60 0.00 - 13.40 %    Eosinophils 0.20 0.00 - 6.90 %    Basophils 0.40 0.00 - 1.80 %    Immature " Granulocytes 0.50 0.00 - 0.90 %    Nucleated RBC 0.00 0.00 - 0.20 /100 WBC    Neutrophils (Absolute) 12.67 (H) 1.82 - 7.42 K/uL    Lymphs (Absolute) 1.90 1.00 - 4.80 K/uL    Monos (Absolute) 0.87 (H) 0.00 - 0.85 K/uL    Eos (Absolute) 0.03 0.00 - 0.51 K/uL    Baso (Absolute) 0.07 0.00 - 0.12 K/uL    Immature Granulocytes (abs) 0.08 0.00 - 0.11 K/uL    NRBC (Absolute) 0.00 K/uL   COMP METABOLIC PANEL   Result Value Ref Range    Sodium 135 135 - 145 mmol/L    Potassium 3.4 (L) 3.6 - 5.5 mmol/L    Chloride 96 96 - 112 mmol/L    Co2 24 20 - 33 mmol/L    Anion Gap 15.0 7.0 - 16.0    Glucose 111 (H) 65 - 99 mg/dL    Bun 10 8 - 22 mg/dL    Creatinine 0.80 0.50 - 1.40 mg/dL    Calcium 10.0 8.5 - 10.5 mg/dL    Correct Calcium 9.0 8.5 - 10.5 mg/dL    AST(SGOT) 37 12 - 45 U/L    ALT(SGPT) 48 2 - 50 U/L    Alkaline Phosphatase 70 30 - 99 U/L    Total Bilirubin 0.9 0.1 - 1.5 mg/dL    Albumin 5.3 (H) 3.2 - 4.9 g/dL    Total Protein 8.6 (H) 6.0 - 8.2 g/dL    Globulin 3.3 1.9 - 3.5 g/dL    A-G Ratio 1.6 g/dL   LIPASE   Result Value Ref Range    Lipase 14 11 - 82 U/L   URINALYSIS    Specimen: Urine   Result Value Ref Range    Color Yellow     Character Clear     Specific Gravity 1.017 <1.035    Ph 6.0 5.0 - 8.0    Glucose Negative Negative mg/dL    Ketones Trace (A) Negative mg/dL    Protein 100 (A) Negative mg/dL    Bilirubin Negative Negative    Urobilinogen, Urine 0.2 Negative    Nitrite Negative Negative    Leukocyte Esterase Negative Negative    Occult Blood Trace (A) Negative    Micro Urine Req Microscopic    ESTIMATED GFR   Result Value Ref Range    GFR (CKD-EPI) 116 >60 mL/min/1.73 m 2   URINE MICROSCOPIC (W/UA)   Result Value Ref Range    WBC 0-2 (A) /hpf    RBC 0-2 (A) /hpf    Bacteria Negative None /hpf    Epithelial Cells Negative /hpf    Hyaline Cast 0-2 /lpf     All labs reviewed by me.    Radiology:   The attending Emergency Physician has independently interpreted the diagnostic imaging associated with this visit and  is awaiting the final reading from the radiologist, which will be displayed below.    Preliminary interpretation is a follows: Inflammation around the appendix  Radiologist interpretation:   CT-ABDOMEN-PELVIS WITH   Final Result      1.  Acute appendicitis.  No evidence for perforation.   2.  Fatty infiltration of liver.           COURSE & MEDICAL DECISION MAKING    ED Observation Status? Yes; I am placing the patient in to an observation status due to a diagnostic uncertainty as well as therapeutic intensity. Patient placed in observation status at 4:32 PM, 11/13/2023.     Observation plan is as follows: Monitor for symptom management and diagnostic results.    Upon Reevaluation, the patient's condition has: not improved; and will be escalated to hospitalization.    Patient discharged from ED Observation status at 6:31 PM (Time) 11/13/2023 (Date).     4:31 PM - Patient seen and evaluated at bedside. Ordered CT-Abdomen Pelvis with, CBC with diff, CMP, Lipase, and Urinalysis to evaluate. He understands and agrees to the plan of care. Differential diagnoses include but are not limited to: Appendicitis, bowel obstruction, constipation, kidney stone      INITIAL ASSESSMENT AND PLAN  Care Narrative: This is a 38-year-old gentleman who presents with abdominal pain.  His pain actually improved by the time he got here he was otherwise well-appearing however labs show he does have an elevated white count at 15.  Given this CT was performed that showed acute appendicitis.  Patient was given antibiotics he will be hospitalized for an appendectomy spoke with general surgery who is agreeable to consult and taken for appendectomy.    5:00 PM - Patient was reevaluated at bedside. Discussed lab and radiology results with the patient. Patient will be treated with 4 mg morphine and 4 mg ondansetron for his symptoms.     6:23 PM- I discussed the patient's case and the above findings with Dr. Murphy (General Surgery) who agreed to  bring the patient up to the OR. Patient's care was transferred at this time.     6:31 PM- Patient was reevaluated at bedside. Discussed lab and radiology results with the patient and informed them that they have appendicitis and will need surgery. The patient had the opportunity to ask any questions. The plan for hospitalization was discussed with the patient given their current presentation and diagnostic study results. The patient is understanding and agreeable to the plan for hospitalization.      HTN/IDDM FOLLOW UP:  The patient has known hypertension and is being followed by their primary care doctor    ADDITIONAL PROBLEM LIST AND DISPOSITION  Hypertension               DISPOSITION AND DISCUSSIONS  I have discussed management of the patient with the following physicians and BRITTANY's: Dr. Murphy (General Surgery)    Discussion of management with other [facility] or appropriate source(s): Radiologist Reviewed Imaging          DISPOSITION:  Patient will be hospitalized by Dr. Murphy (General Surgery) in guarded condition.     FINAL IMPRESSION   1. Acute appendicitis, unspecified acute appendicitis type         I, Cheli Crowley (Scribe), am scribing for, and in the presence of, Inna Milner M.D..    Electronically signed by: Cheli Crowley (Scribe), 11/13/2023    IInna M.D. personally performed the services described in this documentation, as scribed by Cheli Crowley in my presence, and it is both accurate and complete.     The note accurately reflects work and decisions made by me.  Inna Milner M.D.  11/13/2023  10:02 PM

## 2023-11-14 NOTE — OP REPORT
DATE OF OPERATION:   11/13/2023     PREOPERATIVE DIAGNOSIS: Acute appendicitis.    POSTOPERATIVE DIAGNOSIS: Acute appendicitis.     PROCEDURE PERFORMED: Laparoscopic appendectomy     SURGEON:    Sd Murphy M.D.    ASSISTANT:    JAMAAL Fox.    ANESTHESIOLOGIST:  Nestor Samayoa M.D.     ANESTHESIA:   General endotracheal anesthesia.    ASA CLASSIFICATION:  II.    INDICATIONS: The patient is a 38 year-old man with clinical and radiographic findings of acute appendicitis. He is taken to the operating room for laparoscopic appendectomy.     The nature of the surgical procedure warranted additional skilled operative assistance from an Advanced Registered Nurse Practitioner (ARNP). The assistant was present for a substantial portion of the operation and provided assistance to the operating physician throughout the critical aspects of the procedure. The surgical assistant performed the following: provided assistance with optimal surgical exposure of the operative field, provided high complexity, subspecialty decision making input, operated the camera for the laparoscopic portion of the procedure, assisted with the surgical resection, and assisted with the fascial closure.    FINDINGS: The appendix was inflamed but not perforaed.    WOUND CLASSIFICATION:  Class II, Clean Contaminated.    SPECIMEN:     Appendix.    ESTIMATED BLOOD LOSS:   5 mL.     PROCEDURE: Following informed consent consent, the patient was properly identified, taken to the operating room and placed in supine position where general endotracheal anesthesia was administered. Intravenous antibiotics were administered by the anesthesiologist in correct time interval. The patient voided prior to surgery. A urinary catheter was not placed. Sequential compression devices were employed. The abdomen was prepped and draped into a sterile field. A timeout was conducted with the full attention and participation of all operating room  personnel.    Marcaine 0.5% with epinephrine was used to infiltrate the port sites. A 5 mm infraumbilical midline incision was made and subcutaneous tissue spread bluntly. The fascia was elevated with a hook and a Veress needle was atraumatically inserted. Carbon dioxide pneumoperitoneum was instilled.     A 5 mm separator port was passed followed by a 5 mm 30 degree lens and camera. A 12 mm port was placed in left lower quadrant under direct vision. A 5 mm port was placed in suprapubic midline under direct vision. The appendix was identified and elevated. The filmy adhesions were taken down bluntly. The appendiceal mesentery was then taken down  with the harmonic scalpel .The appendix was divided at its base with  an Endo-DILMA stapler with a White Vascular/Thin load.      The appendix was placed within a laparoscopic specimen retrieval bag and delivered intact from the abdominal cavity and submitted for permanent pathology. The resection site was inspected. Hemostasis was satisfactory.    The abdomen was desufflated and the ports were removed. The left lower quadrant port site fascia was approximated using a trocar site closure device with a 0 VICRYL® Plus Antibacterial suture. The port site skin incisions were closed with interrupted 4-0 MONOCRYL® subcuticular sutures. Tegaderm transparent film dressings were applied.    The patient tolerated the procedure well, and there were no apparent complications. All sponge, needle, and instrument counts were correct on 2 separate occasions. The patient was awakened, extubated, and transferred to  to the post anesthesia care unit (PACU) in satisfactory condition.       ____________________________________     Sd Murphy M.D.    DD: 11/13/2023  9:38 PM

## 2023-11-14 NOTE — OR NURSING
Assumed care, pt comfortable at this time, no needs at this time. Dressings CDI.    0800- APRN at bedside.

## 2023-11-14 NOTE — OR NURSING
0900 Report received from PACU Rn and patient arrived to phase 2. AO x 4.     0915  Vital signs stable. Pain level 2/10, ice pack in use, no complaints of n/v.  Dressing to abdomen lap sites x 3 c/d/i. Eating breakfast tray, no nausea.  Voided.      0950 Patient states ready to go home.  VSS, patient has all belongings. IV removed.  Patient got dressed with assistance.  IS in use, good effort, self motivated.  Instructed on splinting when coughing. Discharge instructions discussed with patient and family.  Questions answered. Patient and family verbalized understanding.  Declined use of translation services, wife helping with translation per patient request. Rx electronically sent to patient's pharmacy and read for . Patient taken out with wheelchair, IS, and all belongings.

## 2023-11-14 NOTE — DISCHARGE INSTRUCTIONS
Instrucciones Para [place]  (Home Care Instructions)    ACTIVIDAD: Descanse y tome todo con mucha calma las primeras 24 horas después de ospina cirugía.  Halie persona adulta responsable debe permanecer con usted mukesh juancarlos periodo de tiempo.  Es normal sentirse sonoliento o sonolienta mukesh esas primeras horas.  Le recomendamos que no dawn nada que requiera equilibrio, gail decisiones a mucha coordinación de ospina parte.    NO DAWN ESTO PURANTE LAS PRIMERAS 24 HORAS:   Manejar o conducir algún vehiculo, operar maquinarias o utilizar electrodomesticos.   Beber cerveza o algún otro tipo de bebida alcohólica.   Gail decisiones importantes o firmar documentos legales.    INSTRUCCIONES ESPECIALES: Apendicectomía laparoscópica en adultos, cuidados posteriores  Laparoscopic Appendectomy, Adult, Care After  La siguiente información ofrece orientación sobre cómo cuidarse después del procedimiento. El médico también podrá darle instrucciones más específicas. Comuníquese con el médico si tiene problemas o preguntas.  ¿Qué puedo esperar después del procedimiento?  Después del procedimiento, es normal tener los siguientes síntomas:  Cansancio (fatiga).  Dolor leve en las zonas de la incisión.  Estreñimiento. [place] puede deberse a los analgésicos que se faye y al hecho de estar menos activo mukesh la recuperación.  Dolor por meteorismo que puede irradiarse a los hombros.  Siga estas indicaciones en ospina casa:  Medicamentos  Use los medicamentos de venta emili y los recetados solamente el se lo haya indicado el médico.  Finalice los antibióticos aunque comience a sentirse mejor.  [place] los analgésicos antes de que el dolor se torne intenso.  Pregúntele al médico si ospina afección o ospina medicamento:  Requiere que evite conducir o usar maquinaria.  Puede causarle estreñimiento. Es posible que tenga que gail estas medidas para prevenir o tratar el estreñimiento:  Beber suficiente líquido el para mantener la orina de color amarillo  pálido.  Usar medicamentos recetados o de venta emili.  Consumir alimentos ricos en fibra, el frijoles, cereales integrales, y frutas y verduras frescas.  Limitar el consumo de alimentos ricos en grasa y azúcares procesados, el los alimentos fritos o dulces.  Cuidado de la incisión  Siga las instrucciones del médico acerca del cuidado de las incisiones. Asegúrese de hacer lo siguiente:  Lávese las juancho con agua y jabón mukesh al menos 20 segundos antes y después de cambiarse la venda (vendaje). Use desinfectante para juancho si no dispone de agua y jabón.  Cambie el vendaje el se lo haya indicado el médico.  No retire los puntos (suturas), las grapas, la goma para cerrar la piel o las tiras adhesivas. Es posible que estos cierres cutáneos deban quedar puestos en la piel umkesh 2 semanas o más tiempo. Si los bordes de las tiras adhesivas empiezan a despegarse y enroscarse, puede recortar los que estén sueltos. No retire las tiras adhesivas por completo a menos que el médico se lo indique.  Controle el lugar de la incisión todos los días para detectar signos de infección. Esté atento a los siguientes signos:  Enrojecimiento, hinchazón o dolor.  Líquido o zhang.  Calor.  Pus o mal olor.  Nic  No tome nic de inmersión, no nade ni use el jacuzzi hasta que el médico lo autorice. Pregúntele al médico si puede ducharse. Charan vez solo le permitan darse nic de esponja.  Mantenga las incisiones limpias y secas. Límpielas con la frecuencia que le haya indicado el médico. Para hacer esto:  Lave las incisiones suavemente con agua y jabón.  Enjuáguelas con agua para quitar todo el jabón.  Séquelas susana con jeffrey toalla limpia dando golpecitos. No frote sobre las incisiones.  Actividad  Si le administraron un sedante mukesh el procedimiento, puede afectarlo por varias horas. No conduzca ni opere maquinaria hasta que el médico le indique que es seguro hacerlo.  Abner reposo el se lo haya indicado el médico.  No levante  ningún objeto que pese más de 10 libras (4.5 kg) o el límite de peso que le hayan indicado, hasta que el médico le diga que puede hacerlo.  No practique deportes de contacto hasta que el médico le diga que es seguro hacerlo.  Retome krish actividades normales según lo indicado por el médico. Pregúntele al médico qué actividades son seguras para usted.  Indicaciones generales  Si lo enviaron de regreso a ospina casa con un drenaje, siga las indicaciones del médico sobre cómo cuidarlo.  Abner respiraciones profundas. [place] ayuda a prevenir jeffrey infección en los pulmones (neumonía).  Si necesita toser o estornudar, colóquese jeffrey almohada o jeffrey manta en el abdomen antes de hacerlo. [place] le ayudará a controlar el dolor.  Concurra a todas las visitas de seguimiento. [place] es importante.  Comuníquese con un médico si:  Nota enrojecimiento o hinchazón, o siente dolor alrededor de jeffrey incisión.  Le sale líquido o zhang de jeffrey incisión.  Jeffrey incisión se siente caliente al tacto.  Tiene pus o un olor fétido que provienen de jeffrey incisión o de un vendaje.  Se le abren los bordes de jeffrey incisión después de que le hayan sacado las suturas.  Pierde el apetito, continúa teniendo náuseas o vomita.  Tiene diarrea o no puede controlar la función intestinal.  Presenta jeffrey erupción cutánea.  Solicite ayuda de inmediato si:  Tiene fiebre.  Tiene dificultad para respirar.  Siente george fabiana en el pecho.  Presenta dolor o hinchazón en las piernas.  Se desmaya.  Estos síntomas pueden indicar jeffrey emergencia. Solicite ayuda de inmediato. Llame al 911.  No espere a peter si los síntomas desaparecen.  No conduzca por krish propios medios hasta el hospital.  Resumen  Después de jeffrey apendicectomía laparoscópica, es frecuente experimentar cansancio, dolor leve en la samara de las incisiones, estreñimiento y dolor por meteorismo.  Siga las indicaciones del médico acerca de cómo cuidarse después del procedimiento.  Descanse después del procedimiento. Retome  krish actividades normales según lo indicado por el médico.  Comuníquese con ospina médico si observa signos de infección alrededor de las incisiones. Solicite ayuda de inmediato si experimenta fiebre, dolor en el pecho o dificultad para respirar.  Esta información no tiene el fin reemplazar el consejo del médico. Asegúrese de hacerle al médico cualquier pregunta que tenga.  Document Revised: 10/28/2022 Document Reviewed: 10/28/2022  [employer] Patient Education © 2023 [employer]    Laparoscopic Appendectomy, Adult, Care After  The following information offers guidance on how to care for yourself after your procedure. Your health care provider may also give you more specific instructions. If you have problems or questions, contact your health care provider.  What can I expect after the procedure?  After the procedure, it is common to have:  Tiredness (fatigue).  Mild pain in the incision areas.  Constipation. This may be caused by taking pain medicines and being less active during recovery.  Gas pain that can radiate to your shoulders.  Follow these instructions at home:  Medicines  Take over-the-counter and prescription medicines only as told by your health care provider.  Finish all antibiotic medicine even if you start to feel better.  Take pain medicines before your pain becomes severe.  Ask your health care provider if your condition or your medicine:  Requires you to avoid driving or using machinery.  Can cause constipation. You may need to take these actions to prevent or treat constipation:  Drink enough fluid to keep your urine pale yellow.  Take over-the-counter or prescription medicines.  Eat foods that are high in fiber, such as beans, whole grains, and fresh fruits and vegetables.  Limit foods that are high in fat and processed sugars, such as fried or sweet foods.  Incision care  Follow instructions from your health care provider about how to take care of your incisions. Make sure you:  Wash your hands  with soap and water for at least 20 seconds before and after you change your bandage (dressing). If soap and water are not available, use hand .  Change your dressing as told by your health care provider.  Leave stitches (sutures), staples, skin glue, or adhesive strips in place. These skin closures may need to stay in place for 2 weeks or longer. If adhesive strip edges start to loosen and curl up, you may trim the loose edges. Do not remove adhesive strips completely unless your health care provider tells you to do that.  Check your incision areas every day for signs of infection. Check for:  Redness, swelling, or pain.  Fluid or blood.  Warmth.  Pus or a bad smell.  Bathing  Do not take baths, swim, or use a hot tub until your health care provider approves. Ask your health care provider if you may take showers. You may only be allowed to take sponge baths.  Keep your incisions clean and dry. Clean them as often as told by your health care provider. To do this:  Gently wash the incisions with soap and water.  Rinse the incisions with water to remove all soap.  Pat the incisions dry with a clean towel. Do not rub the incisions.  Activity    If you were given a sedative during the procedure, it can affect you for several hours. Do not drive or operate machinery until your health care provider says that it is safe.  Rest as told by your health care provider.  Do not lift anything that is heavier than 10 lb (4.5 kg), or the limit that you are told, until your health care provider says that it is safe.  Do not play contact sports until your health care provider tells you that it is safe to do so.  Return to your normal activities as told by your health care provider. Ask your health care provider what activities are safe for you.  General instructions  If you were sent home with a drain, follow instructions from your health care provider about how to care for it.  Take deep breaths. This helps to prevent your  lungs from developing an infection (pneumonia).  If you need to cough or sneeze, place a pillow or blanket on your abdomen before you do so. This will help you control the pain.  Keep all follow-up visits. This is important.  Contact a health care provider if:  You have redness, swelling, or pain around an incision.  You have fluid or blood coming from an incision.  An incision feels warm to the touch.  You have pus or a bad smell coming from an incision or dressing.  The edges of an incision break open after your sutures have been removed.  You lose your appetite, keep feeling nauseous, or you are vomiting.  You have diarrhea, or you cannot control your bowel functions.  You develop a rash.  Get help right away if:  You have a fever.  You have difficulty breathing.  You have sharp pains in your chest.  You develop swelling or pain in your legs.  You faint.  These symptoms may be an emergency. Get help right away. Call 911.  Do not wait to see if the symptoms will go away.  Do not drive yourself to the hospital.  Summary  After a laparoscopic appendectomy, it is common to have tiredness, mild pain in the area of the incisions, constipation, and gas pain.  Follow your health care provider's instructions about caring for yourself after the procedure.  Rest after the procedure. Return to your normal activities as told by your health care provider.  Contact your health care provider if you notice signs of infection around your incisions. Get help right away if you develop a fever, chest pain, or difficulty breathing.  This information is not intended to replace advice given to you by your health care provider. Make sure you discuss any questions you have with your health care provider.  Document Revised: 09/29/2022 Document Reviewed: 09/29/2022  [employer] Patient Education © 2023 [employer]    DIETA: Para evitar las nauseas, prosiga despacito con ospina dieta a medida que pueda ir tolerándola mejor, evite comidas muy  condimentadas o grasosas mukesh juancarlos primer día.  Vaya agregando comidas más substanciadas a ospina dieta a medida que asi lo indique ospina médica.  Los bebés pueden beber leche preparada o formula, ásl avelino también leche del seno de la madre a medida que vayan teniendo hambre.  SIGA AGREGANDO LIQUIDOS Y COMIDAS CON FIBRA PARA EVITAR ESTREÑIMIENTO.    AVELINO BAÑARSE Y CAMBIAR LOS VENDAJES DE LA CIRUGIA: can shower at anytime, no baths or wound submersion until cleared by surgeon    MEDICAMENTOS/MEDICINAS:  Vuelva a crispin krish medicamentos diarios.  Courtenay los medicamentos que se le prescribe con un poco de comida.  Si no le prescribe ningún tipo de medicamento, entonces puede crispin medicinas para el dolor que no contienen aspirina, si las necesita.  LAS MEDICINAS PARA EL DOLOR PUEDEN ESTREÑIRLE MUCHO.  Courtenay un suavizante para el excremento o materia fecal (stool softener) o un laxativo avelino por ejemplo: senokot, pericolase, o leche de magnesia, si lo necesita.    La prescripción la administro tylenol and celebrex .  La ultima sosis de medicina para el dolor fue administrada tylenol at 5:00 and celebrex at 8:15.     Se debe hacer jeffrey consulta medica con el doctor en 1-2 semanas, Líame para hacer la zeferino.    Usted debe LIAMAR A OSPINA MEDICO si tiene los siguientes síntomas:   -   Jeffrey fiebre más manpreet de 101 grados Fahrenheit.   -   Un dolor incesante aún con los medicamentos, o nauseas y vómito persistente.   -   Un sangrado excesivo (zhang que traspasa los vendajes o gasas) o algúln tipo de drenaje inesperado que proviene de la henda.     -   Un color arizmendi exagerado o hinchazón alrededor del área en donde se le hizo incisión o amie, o un drenaje de pus o con olor shelbie proveniente de la henda.   -    La inhabilidad de orinar o vaciar ospina vejiga en 8 horas.   -    Problemas con a respiración o george en el pecho.    Usted debe llamar al 911 si se presentan problemas con el dolor al respirar o el pecho.  Si no se puede ponnoer en  comunicación con un medica o con el centro de cirugía, usted debe ir a la estación de emergencia (emergency room) más cercana o a un centro de atención de urgencia (urgent care center).  El teléfono del medico es:     LOS SÍNTOMAS DE UN LEVE RESFRIO SON MUY NORMALES.  ADEMÁS USTED PUEDE LLEGAR A SENTIR WAQAS GENERALES DE MÚSCULOS, IRRITACIÓN EN LA GARGANTA, WAQAS DE KAILEE Y/O UN POCO DE NAUSEAS. [phone]     Sie tiene alguna pregunta, llame a ospina médico.  Si ospina médico no se encuentra disponible, por favor llame al Centro de Cirugía at [phone].  el Centro está abierto de Lunes a Viernes desde las 7:00 de la manana hasta las 5:00 de la noche.      Mi firma a continuación indica que he recibido y entiendco estas instrucciones acera de los cuidados en la casa (Home Care Instructions)    Zenia recibirá jeffrey encuesta en la correspondencia en las siguientes semanas y le pedimos que por favor tome un momento para completar gardenia encuesta y regresaría a hosotros.  Nuestro objetivó es brindarle un cuidado muy hansen y par lo tanto apreciamos krish coméntanos.  Muchas jackie por nancy escogido el [facility].

## 2023-11-14 NOTE — ED NOTES
Bedside report received from off going RN/tech: ENEIDA Ochoa, assumed care of patient.  POC discussed with patient. Call light within reach, all needs addressed at this time.       Fall risk interventions in place: Move the patient closer to the nurse's station, Patient's personal possessions are with in their safe reach, Place socks on patient, and Keep floor surfaces clean and dry (all applicable per Desha Fall risk assessment)   Continuous monitoring: Pulse Ox or Blood Pressure  IVF/IV medications: Not Applicable   Oxygen: Room Air  Bedside sitter: Not Applicable   Isolation: Not Applicable

## 2023-11-14 NOTE — ANESTHESIA PREPROCEDURE EVALUATION
Case: [id number] Date/Time: 11/13/23 1931    Procedure: APPENDECTOMY, LAPAROSCOPIC    Location: [facility] OR 10 / SURGERY [facility]    Surgeons: Sd Murphy M.D.          Acute appendicitis.     Denies angina, dyspnea.     NPO.       Relevant Problems   PULMONARY   (positive) Shortness of breath      CARDIAC   (positive) Aortic ectasia, thoracic (HCC)   (positive) Primary hypertension       Physical Exam    Airway   Mallampati: II  TM distance: >3 FB  Neck ROM: full       Cardiovascular - normal exam  Rhythm: regular  Rate: normal  (-) murmur     Dental - normal exam           Pulmonary - normal exam  Breath sounds clear to auscultation     Abdominal    Neurological - normal exam                   Anesthesia Plan    ASA 2- EMERGENT   ASA physical status emergent criteria: acute peritonitis    Plan - general       Airway plan will be ETT          Induction: intravenous    Postoperative Plan: Postoperative administration of opioids is intended.    Pertinent diagnostic labs and testing reviewed    Informed Consent:    Anesthetic plan and risks discussed with patient.    Use of blood products discussed with: patient whom consented to blood products.

## 2023-11-14 NOTE — ANESTHESIA POSTPROCEDURE EVALUATION
Patient: Gallo Justice    Procedure Summary       Date: 11/13/23 Room / Location: [place] [id number] / SURGERY [facility]    Anesthesia Start: 2052 Anesthesia Stop: 2138    Procedure: APPENDECTOMY, LAPAROSCOPIC (Abdomen) Diagnosis: (Acute Appendicitis)    Surgeons: Sd Murphy M.D. Responsible Provider: Nestor Samayoa M.D.    Anesthesia Type: general ASA Status: 2 - Emergent            Final Anesthesia Type: general  Last vitals  BP   Blood Pressure: (!) 136/101    Temp   36.6 °C (97.8 °F)    Pulse   (!) 107   Resp   18    SpO2   96 %      Anesthesia Post Evaluation    Patient location during evaluation: PACU  Patient participation: complete - patient participated  Level of consciousness: awake and alert  Pain score: 0    Airway patency: patent  Anesthetic complications: no  Cardiovascular status: hemodynamically stable  Respiratory status: acceptable  Hydration status: euvolemic    PONV: none          No notable events documented.

## 2023-11-14 NOTE — H&P
"  CHIEF COMPLAINT: Right lower quadrant pain.     HISTORY OF PRESENT ILLNESS: The patient is a 38 year-old  man who presents to the Emergency Department with a 1- day history of mild and moderate periumbilical and right lower quadrant abdominal pain. The pain is associated with nausea. The patient denies any recent or intercurrent illness. The patient has undergone a right inguinal hernia repair remotely.     PAST MEDICAL HISTORY: HTN    PAST SURGICAL HISTORY: Right inguinal hernia repair remotely    ALLERGIES: No Known Allergies    CURRENT MEDICATIONS:    Home Medications    **Home medications have not yet been reviewed for this encounter**         FAMILY HISTORY: family history is not on file.    SOCIAL HISTORY:  reports that he has never smoked. He has never used smokeless tobacco. He reports current alcohol use. He reports that he does not use drugs.    REVIEW OF SYSTEMS: Comprehensive review of systems is negative with the exception of the aforementioned HPI, PMH, and PSH bullets in accordance with CMS guidelines.    PHYSICAL EXAMINATION:      Constitutional:     Vital Signs: BP (!) 136/101   Pulse (!) 107   Temp 36.6 °C (97.8 °F) (Temporal)   Resp 18   Ht 1.651 m (5' 5\")   Wt 82.2 kg (181 lb 3.5 oz)   SpO2 96%    General Appearance: alert in no acute distress.   HEENT:    Demonstrates symmetric, reactive pupils. Extraocular muscles   are intact. Nares and oropharynx are clear.   Neck:    Supple. No adenopathy.  Respiratory:   Inspection: Unlabored respirations, no intercostal retractions, paradoxical motion, or accessory muscle use.   Auscultation: clear to auscultation.  Cardiovascular:   Inspection: The skin is warm and dry.  Auscultation: Regular rate and rhythm.   Peripheral Pulses: Normal.   Abdomen:  Inspection: Abdominal inspection reveals no abrasions, contusions, lacerations or penetrating wounds.   Palpation: Palpation is remarkable for mild tenderness in the right lower quadrant " region. No abdominal wall hernias.  Extremities:   Examination of the upper and lower extremities demonstrates no cyanosis edema or clubbing.  Neurologic:   Alert & oriented to person, time and place. Normal motor function. Normal sensory function. No focal deficits noted.    LABORATORY VALUES:   Recent Labs     11/13/23  1539   WBC 15.6*   RBC 5.46   HEMOGLOBIN 17.5   HEMATOCRIT 50.3   MCV 92.1   MCH 32.1   MCHC 34.8   RDW 42.0   PLATELETCT 213   MPV 11.8     Recent Labs     11/13/23  1539   SODIUM 135   POTASSIUM 3.4*   CHLORIDE 96   CO2 24   GLUCOSE 111*   BUN 10   CREATININE 0.80   CALCIUM 10.0     Recent Labs     11/13/23  1539   ASTSGOT 37   ALTSGPT 48   TBILIRUBIN 0.9   ALKPHOSPHAT 70   GLOBULIN 3.3            IMAGING:   CT-ABDOMEN-PELVIS WITH   Final Result      1.  Acute appendicitis.  No evidence for perforation.   2.  Fatty infiltration of liver.          ASSESSMENT AND PLAN:     No new Assessment & Plan notes have been filed under this hospital service since the last note was generated.  Service: Surgery General      The patient will be taken to the operating room for laparoscopic appendectomy.  The surgical conduct was discussed in detail. Potential complications including, but not limited to, infection, bleeding, damage to adjacent structures, need to convert to an open procedure, and anesthetic complications were discussed. Operative consent signed.  Patient and family seem pleased with the plan of care and wish to proceed.      ____________________________________     Sd Murphy M.D.    DD: 11/13/2023  8:10 PM    AA[employer] Grading System for EGS Conditions

## 2023-11-14 NOTE — ANESTHESIA PROCEDURE NOTES
Airway    Date/Time: 11/13/2023 8:56 PM    Performed by: Nestor Samayoa M.D.  Authorized by: Nestor Samayoa M.D.    Location:  OR  Urgency:  Elective  Indications for Airway Management:  Anesthesia      Spontaneous Ventilation: absent    Sedation Level:  Deep  Preoxygenated: Yes    Patient Position:  Sniffing  Mask Difficulty Assessment:  0 - not attempted  Final Airway Type:  Endotracheal airway  Final Endotracheal Airway:  ETT  Cuffed: Yes    Technique Used for Successful ETT Placement:  Direct laryngoscopy    Insertion Site:  Oral  Blade Type:  Castanon  Laryngoscope Blade/Videolaryngoscope Blade Size:  2  ETT Size (mm):  7.0  Measured from:  Teeth  ETT to Teeth (cm):  22  Placement Verified by: auscultation and capnometry    Cormack-Lehane Classification:  Grade I - full view of glottis  Number of Attempts at Approach:  1

## 2023-11-14 NOTE — DISCHARGE SUMMARY
DISCHARGE  SUMMARY    DATE OF ADMISSION: 11/13/2023    DATE OF DISCHARGE: 11/14/2023    DISCHARGE DIAGNOSIS:  Principal Problem:    Acute appendicitis  Resolved Problems:    * No resolved hospital problems. *      CONSULTATIONS:  None    PROCEDURES:  Procedure completed by Dr. Murphy on 11/13/2023 laparoscopic appendectomy    BRIEF HPI and HOSPITAL COURSE:  The patient presented to the [facility] with a 1 day history of periumbilical and right lower quadrant abdominal pain.  Imaging was significant for acute appendicitis.  The patient presented to the operating room for the above listed procedure.    On day of discharge the patient is tolerating a regular diet, his pain is well controlled and his is mobilizing well.      DISPOSITION:   Discharged home with family on 11/14/2023. The patient was counseled and questions were answered. Language Line was utilized during the discharge process to adequately answer the patient's questions.  Specifically, signs and symptoms of infection, respiratory decompensation, and persistent or worsening pain were discussed and the patient agrees to seek medical attention if any of these develop.    DISCHARGE MEDICATIONS:  The patients controlled substance history was reviewed and a controlled substance use informed consent (if applicable) was provided by [facility] and the patient has been prescribed.     Medication List        Start taking these medications        Instructions   acetaminophen 325 MG Tabs  Commonly known as: Tylenol   Take 2 Tablets by mouth every 6 hours for 14 days. Take every 6 hours for the next 4 days then every 6 hours as needed  Dose: 650 mg     celecoxib 200 MG Caps  Commonly known as: CeleBREX   Take 1 Capsule by mouth 2 times a day. Take twice daily for 5 days then once daily as needed  Dose: 200 mg     magnesium hydroxide 400 MG/5ML Susp  Commonly known as: Milk Of Magnesia   Take 30 mL by mouth every day.  Dose: 30 mL             Continue taking these medications        Instructions   amLODIPine 10 MG Tabs  Commonly known as: Norvasc   Take 1 Tablet by mouth every day.  Dose: 10 mg     hydroCHLOROthiazide 50 MG Tabs   Take 1 Tablet by mouth every day.  Dose: 50 mg     terazosin 2 MG Caps  Commonly known as: Hytrin   Take 2 mg by mouth at bedtime.  Dose: 2 mg            You will be given a prescription for pain medication at discharge. Please take these as directed. It is important to remember not to take medications on an empty stomach as this may cause nausea.  You may also take over the counter acetaminophen and/or NSAIDS (ibuprofen, Aleve, Advil, Motrin) per the package instructions.  You may also use ice to the wound to decrease pain and swelling. You may alternate 20 minutes on and 20 minutes off with the ice for the first 24-48 hours. Make sure you place a washcloth or towel between the ice pack and your skin.  Please note that narcotic pain medication cannot be refilled unless you are seen by a doctor. Make sure you call the office if you are running low on medication or if the dose you have been prescribed is not working well for you.    ACTIVITY:  After discharge from the hospital, you may resume full routine activities; however, there should be no heavy lifting (greater than 20 pounds or a bag of groceries) and no strenuous activities for at least 2 weeks. The duration may be longer, depending on your surgical procedure. Routine activities of daily living are acceptable. Activity level should be addressed at your post-op follow up appointment. You may drive whenever you are off pain medications and are able to perform the activities needed to drive, i.e., turning, bending, twisting, etc.      WOUND CARE:  You may shower, but do not submerge in a bath for at least two weeks. If you have wound dressings, they may come off after 48 hours. If you have skin glue to the wound, this will fall off on its own, do not pick at it. If  you have steri strips to the wound, these will fall off on their own, do not pick at them, may trim the edges if needed.      DIET:  Upon discharge from the hospital, you may resume your normal preoperative diet, unless specifically directed otherwise. Depending on how you are feeling and whether you have nausea or not, you may wish to stay with a bland diet for the first few days. However, you can advance this as quickly as you feel ready.      FOLLOW UP:  [facility]  [id number]  [place] [address]  [phone]    Schedule an appointment as soon as possible for a visit in 2 week(s)  ACS follow up clinic, will need     Call the office if you have: (1) Fevers to more than 101F, (2) Unusual chest or leg pain, (3) Drainage or fluid from incision that may be foul smelling, increased tenderness or soreness at the wound or the wound edges are no longer together, redness or swelling at the incision site. Do not hesitate to call with any other questions.    TIME SPENT ON DISCHARGE: 35 minutes      ____________________________________________  IDA Campos    DD: 11/14/2023 8:20 AM

## 2023-11-14 NOTE — ANESTHESIA TIME REPORT
Anesthesia Start and Stop Event Times       Date Time Event    11/13/2023 2028 Ready for Procedure     2052 Anesthesia Start     2138 Anesthesia Stop          Responsible Staff  11/13/23      Name Role Begin End    Nestor Samayoa M.D. Anesth 2052 2138          Overtime Reason:  no overtime (within assigned shift)    Comments:

## 2023-11-28 ENCOUNTER — OFFICE VISIT (OUTPATIENT)
Dept: SURGERY | Facility: MEDICAL CENTER | Age: 38
End: 2023-11-28
Attending: SURGERY
Payer: COMMERCIAL

## 2023-11-28 VITALS
HEIGHT: 65 IN | WEIGHT: 186 LBS | OXYGEN SATURATION: 98 % | RESPIRATION RATE: 16 BRPM | SYSTOLIC BLOOD PRESSURE: 140 MMHG | HEART RATE: 82 BPM | BODY MASS INDEX: 30.99 KG/M2 | DIASTOLIC BLOOD PRESSURE: 90 MMHG

## 2023-11-28 DIAGNOSIS — K35.80 ACUTE APPENDICITIS, UNSPECIFIED ACUTE APPENDICITIS TYPE: ICD-10-CM

## 2023-11-28 PROCEDURE — 3077F SYST BP >= 140 MM HG: CPT | Performed by: SURGERY

## 2023-11-28 PROCEDURE — 99024 POSTOP FOLLOW-UP VISIT: CPT | Performed by: SURGERY

## 2023-11-28 PROCEDURE — 3080F DIAST BP >= 90 MM HG: CPT | Performed by: SURGERY

## 2023-11-28 ASSESSMENT — FIBROSIS 4 INDEX: FIB4 SCORE: 0.95

## 2023-11-28 ASSESSMENT — ENCOUNTER SYMPTOMS: ABDOMINAL PAIN: 0

## 2023-11-28 NOTE — PROGRESS NOTES
"Subjective     Bolivar Aaron Justice is a 38 y.o. male who presents with Post-op (Lap appendectomy)            HPI Since DC, doing well. Some abdominal wall pain.    Review of Systems   Gastrointestinal:  Negative for abdominal pain.              Objective     BP (!) 140/90 (BP Location: Right arm, Patient Position: Sitting)   Pulse 82   Resp 16   Ht 1.651 m (5' 5\")   Wt 84.4 kg (186 lb)   SpO2 98%   BMI 30.95 kg/m²      Physical Exam  Pulmonary:      Effort: Pulmonary effort is normal.   Abdominal:      General: There is no distension.      Palpations: Abdomen is soft.      Tenderness: There is no abdominal tenderness.      Comments: Incisions healing well   Musculoskeletal:      Cervical back: Neck supple.   Skin:     General: Skin is warm.   Neurological:      Mental Status: He is alert.                Pathology Acute appy             Assessment & Plan     SSP lap appy    FU PRN                "

## 2024-10-31 ENCOUNTER — APPOINTMENT (OUTPATIENT)
Dept: CARDIOLOGY | Facility: MEDICAL CENTER | Age: 39
End: 2024-10-31
Payer: OTHER MISCELLANEOUS

## 2024-10-31 NOTE — PROGRESS NOTES
"No chief complaint on file.      Subjective     Bolivar Justice is a 39 y.o. male who presents today for follow up.  He has a history of hypertension    Seen by myself on 2/23/2023, at that time he had been feeling overall well. He does mention an episode of shortness of breath and dizziness at work recently.     Seen by myself on 6/21/2023. He reports that he has been taking his blood pressure medications intermittently, he believes that his carvedilol has been causing him to have hot flashes.  He was only taking his blood pressure medication when he \"felt bad\". His medications were adjusted.     Seen by myself on 7/25/2023: he is feeling overall well, but he reports having an ongoing cough which has not improved even after his recovery from influenza A.  Discontinued his benazepril and ordered CTA for surveillance of his aortic ectasia    Today 10/31/2024 ***    No past medical history on file.  Past Surgical History:   Procedure Laterality Date    RI LAP,APPENDECTOMY  11/13/2023    Procedure: APPENDECTOMY, LAPAROSCOPIC;  Surgeon: Sd Murphy M.D.;  Location: SURGERY [facility];  Service: General     No family history on file.  Social History     Socioeconomic History    Marital status:      Spouse name: Not on file    Number of children: Not on file    Years of education: Not on file    Highest education level: Not on file   Occupational History    Not on file   Tobacco Use    Smoking status: Never    Smokeless tobacco: Never   Vaping Use    Vaping status: Never Used   Substance and Sexual Activity    Alcohol use: Yes     Comment: weekends    Drug use: Never    Sexual activity: Not on file   Other Topics Concern    Not on file   Social History Narrative    Not on file     Social Determinants of Health     Financial Resource Strain: Not on file   Food Insecurity: Not on file   Transportation Needs: Not on file   Physical Activity: Not on file   Stress: Not on file   Social Connections: Not on " "file   Intimate Partner Violence: Not on file   Housing Stability: Not on file     No Known Allergies  Outpatient Encounter Medications as of 10/31/2024   Medication Sig Dispense Refill    celecoxib (CELEBREX) 200 MG Cap Take 1 Capsule by mouth 2 times a day. Take twice daily for 5 days then once daily as needed 60 Capsule 0    magnesium hydroxide (MILK OF MAGNESIA) 400 MG/5ML Suspension Take 30 mL by mouth every day.      hydroCHLOROthiazide (HYDRODIURIL) 50 MG Tab Take 1 Tablet by mouth every day. 100 Tablet 3    amLODIPine (NORVASC) 10 MG Tab Take 1 Tablet by mouth every day. 100 Tablet 3    terazosin (HYTRIN) 2 MG Cap Take 2 mg by mouth at bedtime.       No facility-administered encounter medications on file as of 10/31/2024.     ROS           Objective     There were no vitals taken for this visit.    Physical Exam       Lab Results   Component Value Date/Time    WBC 15.6 (H) 11/13/2023 03:39 PM    RBC 5.46 11/13/2023 03:39 PM    HEMOGLOBIN 17.5 11/13/2023 03:39 PM    HEMATOCRIT 50.3 11/13/2023 03:39 PM    MCV 92.1 11/13/2023 03:39 PM    MCH 32.1 11/13/2023 03:39 PM    MCHC 34.8 11/13/2023 03:39 PM    MPV 11.8 11/13/2023 03:39 PM    NEUTSPOLYS 81.10 (H) 11/13/2023 03:39 PM    LYMPHOCYTES 12.20 (L) 11/13/2023 03:39 PM    MONOCYTES 5.60 11/13/2023 03:39 PM    EOSINOPHILS 0.20 11/13/2023 03:39 PM    BASOPHILS 0.40 11/13/2023 03:39 PM      No results found for: \"CHOLSTRLTOT\", \"LDL\", \"HDL\", \"TRIGLYCERIDE\"    Lab Results   Component Value Date/Time    SODIUM 135 11/13/2023 03:39 PM    POTASSIUM 3.4 (L) 11/13/2023 03:39 PM    CHLORIDE 96 11/13/2023 03:39 PM    CO2 24 11/13/2023 03:39 PM    GLUCOSE 111 (H) 11/13/2023 03:39 PM    BUN 10 11/13/2023 03:39 PM    CREATININE 0.80 11/13/2023 03:39 PM     Lab Results   Component Value Date/Time    ALKPHOSPHAT 70 11/13/2023 03:39 PM    ASTSGOT 37 11/13/2023 03:39 PM    ALTSGPT 48 11/13/2023 03:39 PM    TBILIRUBIN 0.9 11/13/2023 03:39 PM      Echocardiogram " "6/16/2023  CONCLUSIONS  No prior study is available for comparison.   The left ventricular ejection fraction is visually estimated to be 60%.  Normal regional wall motion.  The ascending aorta is dilated with a diameter of 4 cm.    Assessment & Plan     1. Hypertensive left ventricular hypertrophy, without heart failure        2. Primary hypertension            Medical Decision Making: Today's Assessment/Status/Plan:        Chronic cough  -possibly due to ACE inhibitor, we will drop the benazapril for now and continue with amlodipine alone     Hypertension  -His blood pressures looking really good, however possible concern with cough due to ACE inhibitor  - goal < 130/80  - check BP daily 2 hours after taking medication and keep log of measurements   -check in in 2 weeks, expect that his blood pressures may be higher with dropping the benazepril     LVH  - noted on EKG  -likely due to uncontrolled HTN, we discussed at length the need to keep his blood pressure under control in order to prevent worsening of this condition  - echocardiogram showed \"Normal left ventricular chamber size. Moderate concentric left ventricular hypertrophy.\"     Aortic ectasia  -Noted on echocardiogram 4 cm  -Check CTA in 1 year for surveillance     Follow up with Belen HINTON in ***     This note was dictated using Dragon speech recognition software.                "

## (undated) DEVICE — LACTATED RINGERS INJ 1000 ML - (14EA/CA 60CA/PF)

## (undated) DEVICE — BAG RETRIEVAL 10ML (10EA/BX)

## (undated) DEVICE — TUBE E-T HI-LO CUFF 7.0MM (10EA/PK)

## (undated) DEVICE — CANISTER SUCTION 3000ML MECHANICAL FILTER AUTO SHUTOFF MEDI-VAC NONSTERILE LF DISP  (40EA/CA)

## (undated) DEVICE — TROCAR 5X100 BLADED Z-THREAD - KII (6/BX)

## (undated) DEVICE — SPONGE GAUZESTER. 2X2 4-PL - (2/PK 50PK/BX 30BX/CS)

## (undated) DEVICE — GLOVE BIOGEL SZ 7.5 SURGICAL PF LTX - (50PR/BX 4BX/CA)

## (undated) DEVICE — ELECTRODE DUAL RETURN W/ CORD - (50/PK)

## (undated) DEVICE — SLEEVE, VASO, THIGH, MED

## (undated) DEVICE — STAPLE 45MM WHTE 2.5MM - (12/BX)

## (undated) DEVICE — NEEDLE INSUFFLATION FOR STEP - (12/BX)

## (undated) DEVICE — SYRINGE SAFETY TB 1 ML 27 GA X 1/2 IN (100/BX 4BX/CA)

## (undated) DEVICE — PACK LAP CHOLE OR - (2EA/CA)

## (undated) DEVICE — SUTURE 4-0 MONOCRYL PLUS PS-2 - 27 INCH (36/BX)

## (undated) DEVICE — DRESSING TRANSPARENT FILM TEGADERM 2.375 X 2.75"  (100EA/BX)"

## (undated) DEVICE — SUTURE GENERAL

## (undated) DEVICE — SEALER VESSEL HARMONIC ACE PLUS WITH ADVANCED HEMOSTASIS 36CM (1/EA)

## (undated) DEVICE — SET LEADWIRE 5 LEAD BEDSIDE DISPOSABLE ECG (1SET OF 5/EA)

## (undated) DEVICE — SODIUM CHL IRRIGATION 0.9% 1000ML (12EA/CA)

## (undated) DEVICE — SET EXTENSION WITH 2 PORTS (48EA/CA) ***PART #2C8610 IS A SUBSTITUTE*****

## (undated) DEVICE — CANNULA W/SEAL 5X100 Z-THRE - ADED KII (12/BX)

## (undated) DEVICE — COVER LIGHT HANDLE ALC PLUS DISP (18EA/BX)

## (undated) DEVICE — SUTURE 0 VICRYL PLUS UR-6 - 27 INCH (36/BX)

## (undated) DEVICE — GLOVE BIOGEL PI INDICATOR SZ 6.5 SURGICAL PF LF - (50/BX 4BX/CA)

## (undated) DEVICE — SCISSORS 5MM CVD (6EA/BX)

## (undated) DEVICE — CHLORAPREP 26 ML APPLICATOR - ORANGE TINT(25/CA)

## (undated) DEVICE — SUCTION INSTRUMENT YANKAUER BULBOUS TIP W/O VENT (50EA/CA)

## (undated) DEVICE — TROCAR Z THREAD12MM OPTICAL - NON BLADED (6/BX)

## (undated) DEVICE — SENSOR OXIMETER ADULT SPO2 RD SET (20EA/BX)

## (undated) DEVICE — ANTI-FOG SOLUTION - 60BTL/CA

## (undated) DEVICE — TUBING CLEARLINK DUO-VENT - C-FLO (48EA/CA)

## (undated) DEVICE — TROCAR LAPSCP 100MM 12MM NTHRD - (6/BX)